# Patient Record
Sex: FEMALE | Race: WHITE | NOT HISPANIC OR LATINO
[De-identification: names, ages, dates, MRNs, and addresses within clinical notes are randomized per-mention and may not be internally consistent; named-entity substitution may affect disease eponyms.]

---

## 2019-04-01 ENCOUNTER — RESULT REVIEW (OUTPATIENT)
Age: 34
End: 2019-04-01

## 2019-04-08 PROBLEM — Z00.00 ENCOUNTER FOR PREVENTIVE HEALTH EXAMINATION: Status: ACTIVE | Noted: 2019-04-08

## 2019-05-06 ENCOUNTER — APPOINTMENT (OUTPATIENT)
Dept: ANTEPARTUM | Facility: CLINIC | Age: 34
End: 2019-05-06
Payer: COMMERCIAL

## 2019-05-06 ENCOUNTER — ASOB RESULT (OUTPATIENT)
Age: 34
End: 2019-05-06

## 2019-05-06 PROCEDURE — 36415 COLL VENOUS BLD VENIPUNCTURE: CPT

## 2019-05-06 PROCEDURE — 99241 OFFICE CONSULTATION NEW/ESTAB PATIENT 15 MIN: CPT | Mod: 25

## 2019-05-06 PROCEDURE — 76811 OB US DETAILED SNGL FETUS: CPT

## 2019-05-14 ENCOUNTER — OUTPATIENT (OUTPATIENT)
Dept: OUTPATIENT SERVICES | Age: 34
LOS: 1 days | Discharge: HOME | End: 2019-05-14

## 2019-05-15 ENCOUNTER — APPOINTMENT (OUTPATIENT)
Dept: PEDIATRIC CARDIOLOGY | Facility: CLINIC | Age: 34
End: 2019-05-15
Payer: COMMERCIAL

## 2019-05-15 PROCEDURE — 76821 MIDDLE CEREBRAL ARTERY ECHO: CPT

## 2019-05-15 PROCEDURE — 93325 DOPPLER ECHO COLOR FLOW MAPG: CPT | Mod: 59

## 2019-05-15 PROCEDURE — 76820 UMBILICAL ARTERY ECHO: CPT

## 2019-05-15 PROCEDURE — 76827 ECHO EXAM OF FETAL HEART: CPT

## 2019-05-15 PROCEDURE — 76825 ECHO EXAM OF FETAL HEART: CPT

## 2019-05-15 PROCEDURE — 99241 OFFICE CONSULTATION NEW/ESTAB PATIENT 15 MIN: CPT | Mod: 25

## 2019-09-05 ENCOUNTER — OUTPATIENT (OUTPATIENT)
Dept: OUTPATIENT SERVICES | Facility: HOSPITAL | Age: 34
LOS: 1 days | End: 2019-09-05

## 2019-09-05 ENCOUNTER — APPOINTMENT (OUTPATIENT)
Dept: ANTEPARTUM | Facility: CLINIC | Age: 34
End: 2019-09-05
Payer: MEDICAID

## 2019-09-05 ENCOUNTER — ASOB RESULT (OUTPATIENT)
Age: 34
End: 2019-09-05

## 2019-09-05 PROCEDURE — 76805 OB US >/= 14 WKS SNGL FETUS: CPT

## 2019-09-05 PROCEDURE — 76820 UMBILICAL ARTERY ECHO: CPT

## 2019-09-05 PROCEDURE — 99213 OFFICE O/P EST LOW 20 MIN: CPT | Mod: 25

## 2019-09-09 ENCOUNTER — ASOB RESULT (OUTPATIENT)
Age: 34
End: 2019-09-09

## 2019-09-09 ENCOUNTER — APPOINTMENT (OUTPATIENT)
Dept: ANTEPARTUM | Facility: CLINIC | Age: 34
End: 2019-09-09
Payer: MEDICAID

## 2019-09-09 ENCOUNTER — APPOINTMENT (OUTPATIENT)
Dept: ANTEPARTUM | Facility: HOSPITAL | Age: 34
End: 2019-09-09

## 2019-09-09 PROCEDURE — 76818 FETAL BIOPHYS PROFILE W/NST: CPT | Mod: 26

## 2019-09-12 ENCOUNTER — APPOINTMENT (OUTPATIENT)
Dept: ANTEPARTUM | Facility: CLINIC | Age: 34
End: 2019-09-12
Payer: MEDICAID

## 2019-09-12 ENCOUNTER — APPOINTMENT (OUTPATIENT)
Dept: ANTEPARTUM | Facility: HOSPITAL | Age: 34
End: 2019-09-12

## 2019-09-12 ENCOUNTER — OUTPATIENT (OUTPATIENT)
Dept: OUTPATIENT SERVICES | Facility: HOSPITAL | Age: 34
LOS: 1 days | End: 2019-09-12
Payer: MEDICAID

## 2019-09-12 ENCOUNTER — ASOB RESULT (OUTPATIENT)
Age: 34
End: 2019-09-12

## 2019-09-12 ENCOUNTER — OUTPATIENT (OUTPATIENT)
Dept: OUTPATIENT SERVICES | Facility: HOSPITAL | Age: 34
LOS: 1 days | End: 2019-09-12

## 2019-09-12 VITALS
HEART RATE: 78 BPM | SYSTOLIC BLOOD PRESSURE: 118 MMHG | TEMPERATURE: 98 F | WEIGHT: 158.07 LBS | RESPIRATION RATE: 14 BRPM | DIASTOLIC BLOOD PRESSURE: 72 MMHG | OXYGEN SATURATION: 99 % | HEIGHT: 63.75 IN

## 2019-09-12 LAB
ANTIBODY ID 1_1: SIGNIFICANT CHANGE UP
APPEARANCE UR: SIGNIFICANT CHANGE UP
BACTERIA # UR AUTO: SIGNIFICANT CHANGE UP
BILIRUB UR-MCNC: NEGATIVE — SIGNIFICANT CHANGE UP
BLD GP AB SCN SERPL QL: POSITIVE — SIGNIFICANT CHANGE UP
BLOOD UR QL VISUAL: NEGATIVE — SIGNIFICANT CHANGE UP
COLOR SPEC: YELLOW — SIGNIFICANT CHANGE UP
DAT POLY-SP REAG RBC QL: NEGATIVE — SIGNIFICANT CHANGE UP
GLUCOSE UR-MCNC: NEGATIVE — SIGNIFICANT CHANGE UP
HCT VFR BLD CALC: 31.6 % — LOW (ref 34.5–45)
HGB BLD-MCNC: 10.3 G/DL — LOW (ref 11.5–15.5)
HYALINE CASTS # UR AUTO: NEGATIVE — SIGNIFICANT CHANGE UP
KETONES UR-MCNC: NEGATIVE — SIGNIFICANT CHANGE UP
LEUKOCYTE ESTERASE UR-ACNC: SIGNIFICANT CHANGE UP
MCHC RBC-ENTMCNC: 29.7 PG — SIGNIFICANT CHANGE UP (ref 27–34)
MCHC RBC-ENTMCNC: 32.6 % — SIGNIFICANT CHANGE UP (ref 32–36)
MCV RBC AUTO: 91.1 FL — SIGNIFICANT CHANGE UP (ref 80–100)
NITRITE UR-MCNC: NEGATIVE — SIGNIFICANT CHANGE UP
NRBC # FLD: 0.04 K/UL — SIGNIFICANT CHANGE UP (ref 0–0)
PH UR: 7 — SIGNIFICANT CHANGE UP (ref 5–8)
PLATELET # BLD AUTO: 210 K/UL — SIGNIFICANT CHANGE UP (ref 150–400)
PMV BLD: 11.4 FL — SIGNIFICANT CHANGE UP (ref 7–13)
PROT UR-MCNC: 20 — SIGNIFICANT CHANGE UP
RBC # BLD: 3.47 M/UL — LOW (ref 3.8–5.2)
RBC # FLD: 12.4 % — SIGNIFICANT CHANGE UP (ref 10.3–14.5)
RBC CASTS # UR COMP ASSIST: SIGNIFICANT CHANGE UP (ref 0–?)
RH IG SCN BLD-IMP: NEGATIVE — SIGNIFICANT CHANGE UP
SP GR SPEC: 1.02 — SIGNIFICANT CHANGE UP (ref 1–1.04)
SQUAMOUS # UR AUTO: SIGNIFICANT CHANGE UP
UROBILINOGEN FLD QL: NORMAL — SIGNIFICANT CHANGE UP
WBC # BLD: 6.93 K/UL — SIGNIFICANT CHANGE UP (ref 3.8–10.5)
WBC # FLD AUTO: 6.93 K/UL — SIGNIFICANT CHANGE UP (ref 3.8–10.5)
WBC UR QL: >50 — HIGH (ref 0–?)

## 2019-09-12 PROCEDURE — 59025 FETAL NON-STRESS TEST: CPT | Mod: 26

## 2019-09-12 PROCEDURE — 86077 PHYS BLOOD BANK SERV XMATCH: CPT

## 2019-09-12 NOTE — OB PST NOTE - HISTORY OF PRESENT ILLNESS
34 year old pregnant female presents to PST with preop diagnosis of IUGR scheduled for cytotec induction of labor for IUGR YUAN 09/21/19. Reports good fetal movement, denies vaginal bleeding or leakage of amniotic fluid. Denies pelvic floor pain or contractions. Patient reports receiving Rhogam.

## 2019-09-12 NOTE — OB PST NOTE - NSHPREVIEWOFSYSTEMS_GEN_ALL_CORE
General: No fever, chills, sweating, anorexia, weight loss or weight gain. No polyphagia, polyurea, polydypsia, malaise, or fatigue    Skin: No rashes, itching, or dryness. No change in size/color of moles. No tumors, brittle nails, pitted nails, or hair loss    Breast: No tenderness, lumps, or nipple discharge      Ophthalmologic: No diplopia, photophobia, lacrimation, blurred Vision , or eye discharge    ENMT Symptoms: No hearing difficulty, ear pain, tinnitus, or vertigo. No sinus symptoms, nasal congestion, nasal   discharge, or nasal obstruction    Respiratory and Thorax: No wheezing, dyspnea, cough, hemoptysis, or pleuritic chest pPain     Cardiovascular: No chest pain, palpitations, dyspnea on exertion, orthopnea, paroxysmal nocturnal dyspnea,   peripheral edema, or claudication    Gastrointestinal: Occasional nausea, Denies vomiting, diarrhea, constipation, change in bowel habits, flatulence, abdominal pain, or melena    Genitourinary/ Pelvis: No hematuria, renal colic, or flank pain.  No urine discoloration, incontinence, dysuria, or urinary hesitancy. Normal urinary frequency. No nocturia, abnormal vaginal bleeding, vaginal discharge, spotting, pelvic pain, or vaginal leakage    Musculoskeletal: No arthralgia, arthritis, joint swelling, muscle cramping, muscle weakness, neck pain, arm pain, or leg pain    Neurological: No transient paralysis, weakness, paresthesias, or seizures. No syncope, tremors, vertigo, loss of sensation, difficulty walking, loss of consciousness, hemiparesis, confusion, or facial palsy    Psychiatric: PMH mood disorder- unable to describe. No suicidal ideation, anxiety, insomnia, memory loss, paranoia,  agitation, hallucinations, or hyperactivity    Hematology: No gum bleeding, nose bleeding, or skin lumps    Lymphatic: No enlarged or tender lymph nodes. No extremity swelling    Endocrine: No heat or cold intolerance    Immunologic: No recurrent or persistent infections General: No fever, chills, sweating, anorexia, weight loss or weight gain. No polyphagia, polyurea, polydypsia, malaise, or fatigue    Skin: No rashes, itching, or dryness. No change in size/color of moles. No tumors, brittle nails, pitted nails, or hair loss    Breast: No tenderness, lumps, or nipple discharge      Ophthalmologic: No diplopia, photophobia, lacrimation, blurred Vision , or eye discharge    ENMT Symptoms: Mild nasal congestion. No hearing difficulty, ear pain, tinnitus, or vertigo. No sinus symptoms, nasal   discharge, or nasal obstruction    Respiratory and Thorax: No wheezing, dyspnea, cough, hemoptysis, or pleuritic chest pPain     Cardiovascular: No chest pain, palpitations, dyspnea on exertion, orthopnea, paroxysmal nocturnal dyspnea,   peripheral edema, or claudication    Gastrointestinal: Occasional nausea, Denies vomiting, diarrhea, constipation, change in bowel habits, flatulence, abdominal pain, or melena    Genitourinary/ Pelvis: No hematuria, renal colic, or flank pain.  No urine discoloration, incontinence, dysuria, or urinary hesitancy. Normal urinary frequency. No nocturia, abnormal vaginal bleeding, vaginal discharge, spotting, pelvic pain, or vaginal leakage    Musculoskeletal: No arthralgia, arthritis, joint swelling, muscle cramping, muscle weakness, neck pain, arm pain, or leg pain    Neurological: No transient paralysis, weakness, paresthesias, or seizures. No syncope, tremors, vertigo, loss of sensation, difficulty walking, loss of consciousness, hemiparesis, confusion, or facial palsy    Psychiatric: PMH mood disorder- unable to describe. No suicidal ideation, anxiety, insomnia, memory loss, paranoia,  agitation, hallucinations, or hyperactivity    Hematology: No gum bleeding, nose bleeding, or skin lumps    Lymphatic: No enlarged or tender lymph nodes. No extremity swelling    Endocrine: No heat or cold intolerance    Immunologic: No recurrent or persistent infections

## 2019-09-12 NOTE — OB PST NOTE - VISION (WITH CORRECTIVE LENSES IF THE PATIENT USUALLY WEARS THEM):
uses glasses and contact/Partially impaired: cannot see medication labels or newsprint, but can see obstacles in path, and the surrounding layout; can count fingers at arm's length

## 2019-09-12 NOTE — OB PST NOTE - PROBLEM SELECTOR PLAN 1
Assessment and Plan: Patient scheduled for cytotec induction of labor for IUGR on 09/18/19  Patient provided with verbal and written presurgical instructions; verbalized understanding  with teach back.

## 2019-09-12 NOTE — OB PST NOTE - NSANTHOSAYNRD_GEN_A_CORE
No. GENIE screening performed.  STOP BANG Legend: 0-2 = LOW Risk; 3-4 = INTERMEDIATE Risk; 5-8 = HIGH Risk

## 2019-09-12 NOTE — OB PST NOTE - NSHPPHYSICALEXAM_GEN_ALL_CORE
Constitutional: Well Developed, Well Groomed, Well Nourished, No Distress    Eyes: PERRL, EOMI, conjunctiva clear    Ears: Normal    Mouth & Gums: Normal, moist    Pharynx: mild nasal congestion No tenderness, discharge, or peritonsillar abscess,     Tonsils: No Redness, discharge, tenderness, or swelling    Neck: Supple, no JVD, normal thyroid glands, no carotid bruits, no cervical vertebral or paraspinal tenderness    Breast: Patient declines    Back: Normal shape, ROM intact, strength intact, no vertebral tenderness    Respiratory: Airway patent, breath sounds equal, good air movement, respiration non-labored, clear to auscultation bilateral, no chest wall tenderness, no intercostal retractions, no rales, no wheezes, no rhonchi, no subcutaneous emphysema    Cardiovascular:  Regular rate and rhythm, no rubs or murmur, normal PMI    Gastrointestinal: Gravid abdomen Soft, non-tender, non distention, no masses palpable, bowel sound normal    Extremities: No clubbing, cyanosis, or pedal edema    Vascular:   Radial Pulse normal,  DP pulse normal, PT pulse normal    Neurological: alert & oriented x 3, sensation intact, cranial nerve intact, normal strength    Skin: warm and dry, normal color    Lymph Nodes: normal posterior cervical lymph node, normal anterior cervical lymph node, normal supraclavicular lymph node,     Musculoskeletal: ROM intact, no joint swelling, warmth, or calf tenderness. Normal strength    Psychiatric: normal affect, normal behavior

## 2019-09-13 LAB — T PALLIDUM AB TITR SER: NEGATIVE — SIGNIFICANT CHANGE UP

## 2019-09-16 ENCOUNTER — INPATIENT (INPATIENT)
Facility: HOSPITAL | Age: 34
LOS: 2 days | Discharge: HOME | End: 2019-09-19
Attending: SPECIALIST | Admitting: SPECIALIST
Payer: MEDICAID

## 2019-09-16 VITALS — DIASTOLIC BLOOD PRESSURE: 82 MMHG | HEART RATE: 68 BPM | SYSTOLIC BLOOD PRESSURE: 137 MMHG

## 2019-09-16 DIAGNOSIS — O26.899 OTHER SPECIFIED PREGNANCY RELATED CONDITIONS, UNSPECIFIED TRIMESTER: ICD-10-CM

## 2019-09-16 DIAGNOSIS — Z3A.00 WEEKS OF GESTATION OF PREGNANCY NOT SPECIFIED: ICD-10-CM

## 2019-09-16 LAB
ANTIBODY ID 1_1: SIGNIFICANT CHANGE UP
BASOPHILS # BLD AUTO: 0.02 K/UL — SIGNIFICANT CHANGE UP (ref 0–0.2)
BASOPHILS NFR BLD AUTO: 0.3 % — SIGNIFICANT CHANGE UP (ref 0–2)
DAT POLY-SP REAG RBC QL: NEGATIVE — SIGNIFICANT CHANGE UP
EOSINOPHIL # BLD AUTO: 0.06 K/UL — SIGNIFICANT CHANGE UP (ref 0–0.5)
EOSINOPHIL NFR BLD AUTO: 0.8 % — SIGNIFICANT CHANGE UP (ref 0–6)
HCT VFR BLD CALC: 33.9 % — LOW (ref 34.5–45)
HGB BLD-MCNC: 10.4 G/DL — LOW (ref 11.5–15.5)
IMM GRANULOCYTES NFR BLD AUTO: 0.4 % — SIGNIFICANT CHANGE UP (ref 0–1.5)
LYMPHOCYTES # BLD AUTO: 1.42 K/UL — SIGNIFICANT CHANGE UP (ref 1–3.3)
LYMPHOCYTES # BLD AUTO: 19.6 % — SIGNIFICANT CHANGE UP (ref 13–44)
MCHC RBC-ENTMCNC: 28.5 PG — SIGNIFICANT CHANGE UP (ref 27–34)
MCHC RBC-ENTMCNC: 30.7 % — LOW (ref 32–36)
MCV RBC AUTO: 92.9 FL — SIGNIFICANT CHANGE UP (ref 80–100)
MONOCYTES # BLD AUTO: 0.45 K/UL — SIGNIFICANT CHANGE UP (ref 0–0.9)
MONOCYTES NFR BLD AUTO: 6.2 % — SIGNIFICANT CHANGE UP (ref 2–14)
NEUTROPHILS # BLD AUTO: 5.28 K/UL — SIGNIFICANT CHANGE UP (ref 1.8–7.4)
NEUTROPHILS NFR BLD AUTO: 72.7 % — SIGNIFICANT CHANGE UP (ref 43–77)
NRBC # FLD: 0 K/UL — SIGNIFICANT CHANGE UP (ref 0–0)
PLATELET # BLD AUTO: 209 K/UL — SIGNIFICANT CHANGE UP (ref 150–400)
PMV BLD: 11.4 FL — SIGNIFICANT CHANGE UP (ref 7–13)
RBC # BLD: 3.65 M/UL — LOW (ref 3.8–5.2)
RBC # FLD: 12.4 % — SIGNIFICANT CHANGE UP (ref 10.3–14.5)
WBC # BLD: 7.26 K/UL — SIGNIFICANT CHANGE UP (ref 3.8–10.5)
WBC # FLD AUTO: 7.26 K/UL — SIGNIFICANT CHANGE UP (ref 3.8–10.5)

## 2019-09-16 PROCEDURE — 86077 PHYS BLOOD BANK SERV XMATCH: CPT

## 2019-09-16 RX ORDER — INFLUENZA VIRUS VACCINE 15; 15; 15; 15 UG/.5ML; UG/.5ML; UG/.5ML; UG/.5ML
0.5 SUSPENSION INTRAMUSCULAR ONCE
Refills: 0 | Status: DISCONTINUED | OUTPATIENT
Start: 2019-09-16 | End: 2019-09-19

## 2019-09-16 RX ORDER — SODIUM CHLORIDE 9 MG/ML
1000 INJECTION, SOLUTION INTRAVENOUS
Refills: 0 | Status: DISCONTINUED | OUTPATIENT
Start: 2019-09-16 | End: 2019-09-16

## 2019-09-16 RX ORDER — SODIUM CHLORIDE 9 MG/ML
1000 INJECTION, SOLUTION INTRAVENOUS
Refills: 0 | Status: DISCONTINUED | OUTPATIENT
Start: 2019-09-16 | End: 2019-09-17

## 2019-09-16 RX ORDER — OXYTOCIN 10 UNIT/ML
VIAL (ML) INJECTION
Qty: 20 | Refills: 0 | Status: DISCONTINUED | OUTPATIENT
Start: 2019-09-16 | End: 2019-09-16

## 2019-09-16 RX ORDER — OXYTOCIN 10 UNIT/ML
333.33 VIAL (ML) INJECTION
Qty: 20 | Refills: 0 | Status: DISCONTINUED | OUTPATIENT
Start: 2019-09-16 | End: 2019-09-18

## 2019-09-16 NOTE — OB PROVIDER TRIAGE NOTE - HISTORY OF PRESENT ILLNESS
34y.o. G1 at 39.2weeks sent in from Dr. Freeman's for unscheduled IOL for SGA baby.  Patient has been seen in ATU for poor fetal growth.  Recommended delivery at 39weeks.  Patient scheduled for induction of labor on Wednesday but due to ATU recommendation patient sent into office today.  Patient reports positive fetal movement, denies lof, denies vaginal bleeding, denies any contractions.    a/p course complications: SGA

## 2019-09-16 NOTE — OB PROVIDER TRIAGE NOTE - NSHPPHYSICALEXAM_GEN_ALL_CORE
Vital Signs Last 24 Hrs  T(C): 36.9 (16 Sep 2019 19:27), Max: 36.9 (16 Sep 2019 19:27)  T(F): 98.4 (16 Sep 2019 19:27), Max: 98.4 (16 Sep 2019 19:27)  HR: 68 (16 Sep 2019 19:27) (68 - 68)  BP: 137/82 (16 Sep 2019 19:27) (137/82 - 137/82)  BP(mean): --  RR: 18 (16 Sep 2019 19:27) (18 - 18)  SpO2: --    abdomen soft nontender gravid  cat 1 FHR   no uterine contractions     VE: 1/40/-3  Cephalic    Clinical EFW: 2700g

## 2019-09-16 NOTE — OB PROVIDER H&P - NSHPPHYSICALEXAM_GEN_ALL_CORE
Vital Signs Last 24 Hrs  T(C): 36.9 (16 Sep 2019 19:27), Max: 36.9 (16 Sep 2019 19:27)  T(F): 98.4 (16 Sep 2019 19:27), Max: 98.4 (16 Sep 2019 19:27)  HR: 68 (16 Sep 2019 19:27) (68 - 68)  BP: 137/82 (16 Sep 2019 19:27) (137/82 - 137/82)  BP(mean): --  RR: 18 (16 Sep 2019 19:27) (18 - 18)  SpO2: --    abdomen soft nontender gravid  cat 1 FHR   no uterine contractions     VE: 1/40/-3  Cephalic    Clinical EFW: 2700g Vital Signs Last 24 Hrs  T(C): 36.9 (16 Sep 2019 19:27), Max: 36.9 (16 Sep 2019 19:27)  T(F): 98.4 (16 Sep 2019 19:27), Max: 98.4 (16 Sep 2019 19:27)  HR: 68 (16 Sep 2019 19:27) (68 - 68)  BP: 137/82 (16 Sep 2019 19:27) (137/82 - 137/82)  BP(mean): --  RR: 18 (16 Sep 2019 19:27) (18 - 18)  SpO2: --    abdomen soft nontender gravid  cat 1 FHR   no uterine contractions     VE: 1/40/-3  Cephalic    Clinical EFW: 2600g

## 2019-09-16 NOTE — OB PROVIDER H&P - URINARY CATHETER
input(s): WBC, HGB, HCT, PLT in the last 72 hours. BMP: No results for input(s): NA, K, CO2, BUN, CREATININE, LABGLOM, GLUCOSE in the last 72 hours. PT/INR: No results for input(s): PROTIME, INR in the last 72 hours. FASTING LIPID PANEL:No results found for: HDL, LDLDIRECT, LDLCALC, TRIG  LIVER PROFILE:No results for input(s): AST, ALT, LABALBU in the last 72 hours.     IMPRESSION:    AFIb, PERMANENT   HTN: UNCONTROLLED  HLP  A/C WITH COUMADIN  CKD  Patient Active Problem List   Diagnosis    CKD (chronic kidney disease) stage 4, GFR 15-29 ml/min (HCC)    HTN (hypertension)    Solitary kidney    Bladder tumor    Hyperparathyroidism ([address]    Acute GI bleeding    Atrial fibrillation (HCC)       RECOMMENDATIONS:  INCREASE NORVASC TO 10 MG, METOPROLOL  MG BID  INR GOAL 2-3  RTC 3 MONTHS        Yadiel Reed MD  [facility]           [phone] no

## 2019-09-16 NOTE — OB PROVIDER IHI INDUCTION/AUGMENTATION NOTE - NS_CHECKALL_OBGYN_ALL_OB
Order was written/Contractions pattern was reviewed/Induction / Augmentation was discussed/FHR was reviewed/H&P was completed

## 2019-09-16 NOTE — OB RN TRIAGE NOTE - CHIEF COMPLAINT QUOTE
my doctor told me today to come in to be induced because the baby is small  scheduled for induction on 9/18

## 2019-09-16 NOTE — OB PROVIDER H&P - ASSESSMENT
pmh: anxiety; depression  psh: denies  obhx: denies  gynhx: denies    NKDA    Medications  Lexapro  PNV    pregnancy complicated by SGA: ATU report from 9/5/2019 reveals EFW 2285  recommended Delivery at 39w    Assessment  Vital Signs Last 24 Hrs  T(C): 36.9 (16 Sep 2019 19:27), Max: 36.9 (16 Sep 2019 19:27)  T(F): 98.4 (16 Sep 2019 19:27), Max: 98.4 (16 Sep 2019 19:27)  HR: 68 (16 Sep 2019 19:27) (68 - 68)  BP: 137/82 (16 Sep 2019 19:27) (137/82 - 137/82)  BP(mean): --  RR: 18 (16 Sep 2019 19:27) (18 - 18)  SpO2: --    abdomen soft nontender gravid  cat 1 FHR   no uterine contractions     VE: 1/40/-3  Cephalic    Clinical EFW: 2700g    Admit to Labor and Delivery for Induction of Labor for SGA  Routine Admission labs ordered  PO Cytotec for IOL   Epidural PRN    D/w Dr. Hines

## 2019-09-17 ENCOUNTER — APPOINTMENT (OUTPATIENT)
Dept: ANTEPARTUM | Facility: CLINIC | Age: 34
End: 2019-09-17

## 2019-09-17 ENCOUNTER — TRANSCRIPTION ENCOUNTER (OUTPATIENT)
Age: 34
End: 2019-09-17

## 2019-09-17 ENCOUNTER — RESULT REVIEW (OUTPATIENT)
Age: 34
End: 2019-09-17

## 2019-09-17 ENCOUNTER — APPOINTMENT (OUTPATIENT)
Dept: ANTEPARTUM | Facility: HOSPITAL | Age: 34
End: 2019-09-17

## 2019-09-17 DIAGNOSIS — O61.9 FAILED INDUCTION OF LABOR, UNSPECIFIED: ICD-10-CM

## 2019-09-17 LAB — T PALLIDUM AB TITR SER: NEGATIVE — SIGNIFICANT CHANGE UP

## 2019-09-17 PROCEDURE — 88307 TISSUE EXAM BY PATHOLOGIST: CPT | Mod: 26

## 2019-09-17 RX ORDER — GLYCERIN ADULT
1 SUPPOSITORY, RECTAL RECTAL AT BEDTIME
Refills: 0 | Status: DISCONTINUED | OUTPATIENT
Start: 2019-09-17 | End: 2019-09-19

## 2019-09-17 RX ORDER — IBUPROFEN 200 MG
600 TABLET ORAL EVERY 6 HOURS
Refills: 0 | Status: COMPLETED | OUTPATIENT
Start: 2019-09-17 | End: 2020-08-15

## 2019-09-17 RX ORDER — KETOROLAC TROMETHAMINE 30 MG/ML
30 SYRINGE (ML) INJECTION ONCE
Refills: 0 | Status: DISCONTINUED | OUTPATIENT
Start: 2019-09-17 | End: 2019-09-17

## 2019-09-17 RX ORDER — AER TRAVELER 0.5 G/1
1 SOLUTION RECTAL; TOPICAL EVERY 4 HOURS
Refills: 0 | Status: DISCONTINUED | OUTPATIENT
Start: 2019-09-17 | End: 2019-09-19

## 2019-09-17 RX ORDER — BENZOCAINE 10 %
1 GEL (GRAM) MUCOUS MEMBRANE EVERY 6 HOURS
Refills: 0 | Status: DISCONTINUED | OUTPATIENT
Start: 2019-09-17 | End: 2019-09-19

## 2019-09-17 RX ORDER — OXYCODONE HYDROCHLORIDE 5 MG/1
5 TABLET ORAL
Refills: 0 | Status: DISCONTINUED | OUTPATIENT
Start: 2019-09-17 | End: 2019-09-19

## 2019-09-17 RX ORDER — LANOLIN
1 OINTMENT (GRAM) TOPICAL EVERY 6 HOURS
Refills: 0 | Status: DISCONTINUED | OUTPATIENT
Start: 2019-09-17 | End: 2019-09-19

## 2019-09-17 RX ORDER — DIPHENHYDRAMINE HCL 50 MG
25 CAPSULE ORAL EVERY 6 HOURS
Refills: 0 | Status: DISCONTINUED | OUTPATIENT
Start: 2019-09-17 | End: 2019-09-19

## 2019-09-17 RX ORDER — HYDROCORTISONE 1 %
1 OINTMENT (GRAM) TOPICAL EVERY 6 HOURS
Refills: 0 | Status: DISCONTINUED | OUTPATIENT
Start: 2019-09-17 | End: 2019-09-19

## 2019-09-17 RX ORDER — SODIUM CHLORIDE 9 MG/ML
1000 INJECTION, SOLUTION INTRAVENOUS
Refills: 0 | Status: DISCONTINUED | OUTPATIENT
Start: 2019-09-17 | End: 2019-09-17

## 2019-09-17 RX ORDER — OXYTOCIN 10 UNIT/ML
333.33 VIAL (ML) INJECTION
Qty: 20 | Refills: 0 | Status: DISCONTINUED | OUTPATIENT
Start: 2019-09-17 | End: 2019-09-18

## 2019-09-17 RX ORDER — BUTORPHANOL TARTRATE 2 MG/ML
2 INJECTION, SOLUTION INTRAMUSCULAR; INTRAVENOUS ONCE
Refills: 0 | Status: DISCONTINUED | OUTPATIENT
Start: 2019-09-17 | End: 2019-09-17

## 2019-09-17 RX ORDER — ESCITALOPRAM OXALATE 10 MG/1
1 TABLET, FILM COATED ORAL
Qty: 0 | Refills: 0 | DISCHARGE

## 2019-09-17 RX ORDER — DOCUSATE SODIUM 100 MG
100 CAPSULE ORAL
Refills: 0 | Status: DISCONTINUED | OUTPATIENT
Start: 2019-09-17 | End: 2019-09-19

## 2019-09-17 RX ORDER — SODIUM CHLORIDE 9 MG/ML
3 INJECTION INTRAMUSCULAR; INTRAVENOUS; SUBCUTANEOUS EVERY 8 HOURS
Refills: 0 | Status: DISCONTINUED | OUTPATIENT
Start: 2019-09-17 | End: 2019-09-19

## 2019-09-17 RX ORDER — TETANUS TOXOID, REDUCED DIPHTHERIA TOXOID AND ACELLULAR PERTUSSIS VACCINE, ADSORBED 5; 2.5; 8; 8; 2.5 [IU]/.5ML; [IU]/.5ML; UG/.5ML; UG/.5ML; UG/.5ML
0.5 SUSPENSION INTRAMUSCULAR ONCE
Refills: 0 | Status: DISCONTINUED | OUTPATIENT
Start: 2019-09-17 | End: 2019-09-19

## 2019-09-17 RX ORDER — OXYCODONE HYDROCHLORIDE 5 MG/1
5 TABLET ORAL ONCE
Refills: 0 | Status: DISCONTINUED | OUTPATIENT
Start: 2019-09-17 | End: 2019-09-19

## 2019-09-17 RX ORDER — MAGNESIUM HYDROXIDE 400 MG/1
30 TABLET, CHEWABLE ORAL
Refills: 0 | Status: DISCONTINUED | OUTPATIENT
Start: 2019-09-17 | End: 2019-09-19

## 2019-09-17 RX ORDER — DIBUCAINE 1 %
1 OINTMENT (GRAM) RECTAL EVERY 6 HOURS
Refills: 0 | Status: DISCONTINUED | OUTPATIENT
Start: 2019-09-17 | End: 2019-09-19

## 2019-09-17 RX ORDER — ONDANSETRON 8 MG/1
4 TABLET, FILM COATED ORAL ONCE
Refills: 0 | Status: COMPLETED | OUTPATIENT
Start: 2019-09-17 | End: 2019-09-17

## 2019-09-17 RX ORDER — SIMETHICONE 80 MG/1
80 TABLET, CHEWABLE ORAL EVERY 4 HOURS
Refills: 0 | Status: DISCONTINUED | OUTPATIENT
Start: 2019-09-17 | End: 2019-09-19

## 2019-09-17 RX ORDER — ACETAMINOPHEN 500 MG
975 TABLET ORAL
Refills: 0 | Status: DISCONTINUED | OUTPATIENT
Start: 2019-09-17 | End: 2019-09-19

## 2019-09-17 RX ORDER — PRAMOXINE HYDROCHLORIDE 150 MG/15G
1 AEROSOL, FOAM RECTAL EVERY 4 HOURS
Refills: 0 | Status: DISCONTINUED | OUTPATIENT
Start: 2019-09-17 | End: 2019-09-19

## 2019-09-17 RX ORDER — OXYTOCIN 10 UNIT/ML
2 VIAL (ML) INJECTION
Qty: 30 | Refills: 0 | Status: DISCONTINUED | OUTPATIENT
Start: 2019-09-17 | End: 2019-09-18

## 2019-09-17 RX ADMIN — Medication 30 MILLIGRAM(S): at 22:00

## 2019-09-17 RX ADMIN — BUTORPHANOL TARTRATE 2 MILLIGRAM(S): 2 INJECTION, SOLUTION INTRAMUSCULAR; INTRAVENOUS at 11:23

## 2019-09-17 RX ADMIN — SODIUM CHLORIDE 125 MILLILITER(S): 9 INJECTION, SOLUTION INTRAVENOUS at 19:05

## 2019-09-17 RX ADMIN — SODIUM CHLORIDE 125 MILLILITER(S): 9 INJECTION, SOLUTION INTRAVENOUS at 08:39

## 2019-09-17 RX ADMIN — Medication 1000 MILLIUNIT(S)/MIN: at 21:02

## 2019-09-17 RX ADMIN — ONDANSETRON 4 MILLIGRAM(S): 8 TABLET, FILM COATED ORAL at 11:23

## 2019-09-17 RX ADMIN — Medication 2 MILLIUNIT(S)/MIN: at 19:44

## 2019-09-17 NOTE — DISCHARGE NOTE OB - PATIENT PORTAL LINK FT
You can access the FollowMyHealth Patient Portal offered by [facility] by registering at the following website: [email or web address]. By joining [facility]’s FollowMyHealth portal, you will also be able to view your health information using other applications (apps) compatible with our system.

## 2019-09-17 NOTE — OB NEONATOLOGY/PEDIATRICIAN DELIVERY SUMMARY - NSPEDSNEONOTESA_OBGYN_ALL_OB_FT
39.3 week female born to a 35 yo  mother via  induction. Peds called for category 2 tracing. Maternal blood type B- (rhogram given in July). Maternal hx nonsignificant. Prenatal hx nonsignificant. PNLs neg/NR/immune. GBS neg . AROM clear at 1840 . Baby emerged vigorous and crying. Warmed, dried and stimulated. Breastfeeding, consents HepB. APGARs 8/9. EOS 0.11.

## 2019-09-17 NOTE — DISCHARGE NOTE OB - CARE PROVIDER_API CALL
Little Delacruz)  Obstetrics and Gynecology  [address] [place] [address]  Phone: [phone]  Fax: [phone]  Follow Up Time:

## 2019-09-17 NOTE — OB RN DELIVERY SUMMARY - NS_SEPSISRSKCALC_OBGYN_ALL_OB_FT
EOS calculated successfully. EOS Risk Factor: 0.5/1000 live births ([facility][note 3] national incidence); GA=39w3d; Temp=98.78; ROM=2.267; GBS='Negative'; Antibiotics='No antibiotics or any antibiotics < 2 hrs prior to birth'

## 2019-09-17 NOTE — OB PROVIDER DELIVERY SUMMARY - NSPROVIDERDELIVERYNOTE_OBGYN_ALL_OB_FT
of liveborn infant, APGARs 8/9. Head, shoulders and torso were delivered easily. The infant was suctioned and handed to patient. Delayed cord clamping was performed and a segment was cut and taken for blood gases. Gentle cord traction and fundal massage resulted in spontaneous delivery of an intact placenta. Pitocin was started, uterine fundus was firm. Visualization of vaginal vault showed no vaginal laceration. Hemostasis was achieved.  Patient was stable, sponge count was correct. The patient was washed and to be sent to recovery.     Jesus Corral PGY1

## 2019-09-17 NOTE — DISCHARGE NOTE OB - MATERIALS PROVIDED
Immunization Record/Vaccinations/Discharge Medication Information for Patients and Families Pocket Guide/Letter of Medical Neccessity/Breastfeeding Mother’s Support Group Information/Guide to Postpartum Care/Prescription for Breast Pump/[facility] [place] Screening Program/[facility] Hearing Screen Program/Breastfeeding Log/Shaken Baby Prevention Handout/Back To Sleep Handout/Breastfeeding Guide and Packet/Birth Certificate Instructions/Tdap Vaccination (VIS Pub Date: 2012)

## 2019-09-17 NOTE — DISCHARGE NOTE OB - MEDICATION SUMMARY - MEDICATIONS TO TAKE
I will START or STAY ON the medications listed below when I get home from the hospital:  None I will START or STAY ON the medications listed below when I get home from the hospital:    ibuprofen 600 mg oral tablet  -- 1 tab(s) by mouth every 6 hours, As Needed  -- Indication: For pain    acetaminophen 325 mg oral tablet  -- 3 tab(s) by mouth , As Needed  -- Indication: For pain

## 2019-09-17 NOTE — DISCHARGE NOTE OB - CARE PLAN
Principal Discharge DX:	Small for gestational age  Goal:	routine  Assessment and plan of treatment:	routine

## 2019-09-18 RX ORDER — IBUPROFEN 200 MG
600 TABLET ORAL EVERY 6 HOURS
Refills: 0 | Status: DISCONTINUED | OUTPATIENT
Start: 2019-09-18 | End: 2019-09-19

## 2019-09-18 RX ADMIN — Medication 600 MILLIGRAM(S): at 12:04

## 2019-09-18 RX ADMIN — Medication 600 MILLIGRAM(S): at 06:00

## 2019-09-18 RX ADMIN — Medication 600 MILLIGRAM(S): at 05:07

## 2019-09-18 RX ADMIN — Medication 600 MILLIGRAM(S): at 01:27

## 2019-09-18 RX ADMIN — Medication 1 TABLET(S): at 12:04

## 2019-09-18 RX ADMIN — SODIUM CHLORIDE 3 MILLILITER(S): 9 INJECTION INTRAMUSCULAR; INTRAVENOUS; SUBCUTANEOUS at 05:07

## 2019-09-18 RX ADMIN — Medication 600 MILLIGRAM(S): at 19:20

## 2019-09-18 RX ADMIN — Medication 600 MILLIGRAM(S): at 18:42

## 2019-09-18 NOTE — PROGRESS NOTE ADULT - SUBJECTIVE AND OBJECTIVE BOX
ANESTHESIA POSTOP CHECK    34y Female POSTOP DAY 1 S/P vaginal delivery    Vital Signs Last 24 Hrs  T(C): 36.7 (18 Sep 2019 06:46), Max: 37.1 (17 Sep 2019 18:57)  T(F): 98.1 (18 Sep 2019 06:46), Max: 98.78 (17 Sep 2019 18:57)  HR: 70 (18 Sep 2019 06:46) (54 - 112)  BP: 99/62 (18 Sep 2019 06:46) (99/62 - 199/86)  BP(mean): --  RR: 17 (18 Sep 2019 06:46) (16 - 17)  SpO2: 98% (18 Sep 2019 06:46) (89% - 100%)  I&O's Summary    17 Sep 2019 07:01  -  18 Sep 2019 07:00  --------------------------------------------------------  IN: 0 mL / OUT: 2175 mL / NET: -2175 mL        [x ] NO APPARENT ANESTHESIA COMPLICATIONS      Comments:

## 2019-09-18 NOTE — PROGRESS NOTE ADULT - SUBJECTIVE AND OBJECTIVE BOX
S: Patient doing well.     Pain controlled.  +OOB.  +void.    Tolerating PO.  Lochia WNL.    O: Vital Signs Last 24 Hrs  T(C): 36.6 (18 Sep 2019 02:24), Max: 37.1 (17 Sep 2019 18:57)  T(F): 97.9 (18 Sep 2019 02:24), Max: 98.78 (17 Sep 2019 18:57)  HR: 58 (17 Sep 2019 23:15) (54 - 112)  BP: 127/67 (17 Sep 2019 23:15) (103/11 - 199/86)  BP(mean): --  RR: 16 (18 Sep 2019 02:24) (16 - 17)  SpO2: 99% (18 Sep 2019 02:24) (89% - 100%)      Pt without complaints  vital signs stable  Abdomen soft  fundus firm, non tender  extremities non tender  lochia wnl    Patient doing well  Routine post partum care    Labs:                        10.4   7.26  )-----------( 209      ( 16 Sep 2019 21:11 )             33.9       ABO Interpretation: B ( @ 16:36)    Rh Interpretation: Negative ( @ 16:36)    Antibody Screen Positive      A: 34y s/p  doing well.   -Continue routine postpartum care.  -Discharge planned for tomorrow

## 2019-09-18 NOTE — LACTATION INITIAL EVALUATION - NS LACT CON REASON FOR REQ
Discussed breastfeeding strategies, assistance offered prn./primaparous mom/general questions without assessment

## 2019-09-19 VITALS
OXYGEN SATURATION: 100 % | SYSTOLIC BLOOD PRESSURE: 118 MMHG | RESPIRATION RATE: 17 BRPM | TEMPERATURE: 98 F | DIASTOLIC BLOOD PRESSURE: 63 MMHG | HEART RATE: 70 BPM

## 2019-09-19 RX ORDER — IBUPROFEN 200 MG
1 TABLET ORAL
Qty: 0 | Refills: 0 | DISCHARGE
Start: 2019-09-19

## 2019-09-19 RX ORDER — ACETAMINOPHEN 500 MG
3 TABLET ORAL
Qty: 0 | Refills: 0 | DISCHARGE
Start: 2019-09-19

## 2019-09-19 RX ADMIN — Medication 600 MILLIGRAM(S): at 07:39

## 2019-09-19 RX ADMIN — Medication 600 MILLIGRAM(S): at 01:00

## 2019-09-19 RX ADMIN — Medication 600 MILLIGRAM(S): at 00:00

## 2019-09-19 RX ADMIN — Medication 600 MILLIGRAM(S): at 06:38

## 2019-09-19 NOTE — PROGRESS NOTE ADULT - SUBJECTIVE AND OBJECTIVE BOX
Subjective  Pain: well controlled  Complaints: none  Milestones: Alert and orientedx3. Out of bed ambulating. Voiding/Due to void. Tolerating a regular diet.  Infant feeding: breast                                 Feeding related issues or concerns:none    Objective   T(C): 36.7 (09-19-19 @ 05:26), Max: 36.7 (09-18-19 @ 18:12)  HR: 70 (09-19-19 @ 05:26) (70 - 73)  BP: 118/63 (09-19-19 @ 05:26) (106/74 - 118/63)  RR: 17 (09-19-19 @ 05:26) (15 - 17)  SpO2: 100% (09-19-19 @ 05:26) (100% - 100%)  Wt(kg): --      Assessment      35 y/o G 1 P 1 .Day #2  postpartum.  Assisted delivery (vacuum, forceps): n/a  Indication for assisted delivery:  Past medical history significant for Anxiety  Current Issues: none  Breasts: soft  Abdomen: Soft, nontender, nondistended.  Vagina: Lochia moderate  Perineum:  intact,   Lower extremities: No edema noted bilaterally of lower extremities. Nontender calves.  Negative Stacey's sign. Positive pedal pulses.  Other relevant physical exam findings;      Plan  Plan: Increase ambulation, analgesia PRN and pain medication protocal standing oxycodone, ibuprofen and acetaminophen.  Diet: Continue regular diet  Labs: nl  Continue routine postpartum care.   Anticipated discharge today Subjective  Pain: well controlled  Complaints: none  Milestones: Alert and orientedx3. Out of bed ambulating. Voiding/Due to void. Tolerating a regular diet.  Infant feeding: breast                                 Feeding related issues or concerns:none    Objective   T(C): 36.7 (09-19-19 @ 05:26), Max: 36.7 (09-18-19 @ 18:12)  HR: 70 (09-19-19 @ 05:26) (70 - 73)  BP: 118/63 (09-19-19 @ 05:26) (106/74 - 118/63)  RR: 17 (09-19-19 @ 05:26) (15 - 17)  SpO2: 100% (09-19-19 @ 05:26) (100% - 100%)  Wt(kg): --      Assessment      33 y/o G 1 P 1 .Day #2  postpartum.  Assisted delivery (vacuum, forceps): n/a  Indication for assisted delivery:  Past medical history significant for Anxiety  Current Issues: none  Breasts: soft  Abdomen: Soft, nontender, nondistended.  Vagina: Lochia moderate  Perineum:  intact,   Lower extremities: No edema noted bilaterally of lower extremities. Nontender calves.  Negative Stacey's sign. Positive pedal pulses.  Other relevant physical exam findings;      Plan  Plan: Increase ambulation, analgesia PRN and pain medication protocal standing oxycodone, ibuprofen and acetaminophen.  Diet: Continue regular diet  Labs: nl  Social work consult  Continue routine postpartum care.   Anticipated discharge today

## 2019-09-20 DIAGNOSIS — O28.1 ABNORMAL BIOCHEMICAL FINDING ON ANTENATAL SCREENING OF MOTHER: ICD-10-CM

## 2019-09-20 DIAGNOSIS — O36.5930 MATERNAL CARE FOR OTHER KNOWN OR SUSPECTED POOR FETAL GROWTH, THIRD TRIMESTER, NOT APPLICABLE OR UNSPECIFIED: ICD-10-CM

## 2019-09-20 DIAGNOSIS — Z82.79 FAMILY HISTORY OF OTHER CONGENITAL MALFORMATIONS, DEFORMATIONS AND CHROMOSOMAL ABNORMALITIES: ICD-10-CM

## 2019-09-20 DIAGNOSIS — Z3A.37 37 WEEKS GESTATION OF PREGNANCY: ICD-10-CM

## 2019-10-03 DIAGNOSIS — Z82.79 FAMILY HISTORY OF OTHER CONGENITAL MALFORMATIONS, DEFORMATIONS AND CHROMOSOMAL ABNORMALITIES: ICD-10-CM

## 2019-10-03 DIAGNOSIS — Z3A.38 38 WEEKS GESTATION OF PREGNANCY: ICD-10-CM

## 2019-10-03 DIAGNOSIS — O28.1 ABNORMAL BIOCHEMICAL FINDING ON ANTENATAL SCREENING OF MOTHER: ICD-10-CM

## 2019-10-03 DIAGNOSIS — O36.5930 MATERNAL CARE FOR OTHER KNOWN OR SUSPECTED POOR FETAL GROWTH, THIRD TRIMESTER, NOT APPLICABLE OR UNSPECIFIED: ICD-10-CM

## 2020-06-10 ENCOUNTER — RESULT REVIEW (OUTPATIENT)
Age: 35
End: 2020-06-10

## 2020-07-14 ENCOUNTER — ASOB RESULT (OUTPATIENT)
Age: 35
End: 2020-07-14

## 2020-07-14 ENCOUNTER — APPOINTMENT (OUTPATIENT)
Dept: ANTEPARTUM | Facility: CLINIC | Age: 35
End: 2020-07-14
Payer: MEDICAID

## 2020-07-14 ENCOUNTER — APPOINTMENT (OUTPATIENT)
Dept: MATERNAL FETAL MEDICINE | Facility: CLINIC | Age: 35
End: 2020-07-14

## 2020-07-14 PROCEDURE — 36416 COLLJ CAPILLARY BLOOD SPEC: CPT

## 2020-07-14 PROCEDURE — 99213 OFFICE O/P EST LOW 20 MIN: CPT | Mod: 25

## 2020-07-14 PROCEDURE — 76801 OB US < 14 WKS SINGLE FETUS: CPT

## 2020-07-14 PROCEDURE — 36415 COLL VENOUS BLD VENIPUNCTURE: CPT

## 2020-07-14 PROCEDURE — 76813 OB US NUCHAL MEAS 1 GEST: CPT

## 2020-09-01 ENCOUNTER — APPOINTMENT (OUTPATIENT)
Dept: ANTEPARTUM | Facility: CLINIC | Age: 35
End: 2020-09-01

## 2020-09-01 ENCOUNTER — ASOB RESULT (OUTPATIENT)
Age: 35
End: 2020-09-01

## 2020-09-01 ENCOUNTER — APPOINTMENT (OUTPATIENT)
Dept: ANTEPARTUM | Facility: CLINIC | Age: 35
End: 2020-09-01
Payer: MEDICAID

## 2020-09-01 PROCEDURE — 76811 OB US DETAILED SNGL FETUS: CPT

## 2020-11-10 ENCOUNTER — ASOB RESULT (OUTPATIENT)
Age: 35
End: 2020-11-10

## 2020-11-10 ENCOUNTER — APPOINTMENT (OUTPATIENT)
Dept: ANTEPARTUM | Facility: CLINIC | Age: 35
End: 2020-11-10
Payer: MEDICAID

## 2020-11-10 PROCEDURE — 76819 FETAL BIOPHYS PROFIL W/O NST: CPT

## 2020-11-10 PROCEDURE — 99072 ADDL SUPL MATRL&STAF TM PHE: CPT

## 2020-11-10 PROCEDURE — 76816 OB US FOLLOW-UP PER FETUS: CPT

## 2020-11-24 ENCOUNTER — APPOINTMENT (OUTPATIENT)
Dept: ANTEPARTUM | Facility: CLINIC | Age: 35
End: 2020-11-24
Payer: MEDICAID

## 2020-11-24 ENCOUNTER — ASOB RESULT (OUTPATIENT)
Age: 35
End: 2020-11-24

## 2020-11-24 ENCOUNTER — APPOINTMENT (OUTPATIENT)
Dept: ANTEPARTUM | Facility: HOSPITAL | Age: 35
End: 2020-11-24

## 2020-11-24 PROCEDURE — 76819 FETAL BIOPHYS PROFIL W/O NST: CPT

## 2020-11-24 PROCEDURE — 76816 OB US FOLLOW-UP PER FETUS: CPT

## 2020-12-22 ENCOUNTER — ASOB RESULT (OUTPATIENT)
Age: 35
End: 2020-12-22

## 2020-12-22 ENCOUNTER — APPOINTMENT (OUTPATIENT)
Dept: ANTEPARTUM | Facility: CLINIC | Age: 35
End: 2020-12-22
Payer: MEDICAID

## 2020-12-22 PROCEDURE — 99072 ADDL SUPL MATRL&STAF TM PHE: CPT

## 2020-12-22 PROCEDURE — 76816 OB US FOLLOW-UP PER FETUS: CPT

## 2020-12-22 PROCEDURE — 76819 FETAL BIOPHYS PROFIL W/O NST: CPT

## 2021-01-06 ENCOUNTER — RESULT REVIEW (OUTPATIENT)
Age: 36
End: 2021-01-06

## 2021-01-06 ENCOUNTER — INPATIENT (INPATIENT)
Facility: HOSPITAL | Age: 36
LOS: 1 days | Discharge: HOME | End: 2021-01-08
Attending: SPECIALIST | Admitting: SPECIALIST
Payer: MEDICAID

## 2021-01-06 VITALS — TEMPERATURE: 99 F

## 2021-01-06 DIAGNOSIS — Z3A.00 WEEKS OF GESTATION OF PREGNANCY NOT SPECIFIED: ICD-10-CM

## 2021-01-06 DIAGNOSIS — O26.899 OTHER SPECIFIED PREGNANCY RELATED CONDITIONS, UNSPECIFIED TRIMESTER: ICD-10-CM

## 2021-01-06 LAB
BASOPHILS # BLD AUTO: 0.03 K/UL — SIGNIFICANT CHANGE UP (ref 0–0.2)
BASOPHILS NFR BLD AUTO: 0.5 % — SIGNIFICANT CHANGE UP (ref 0–2)
EOSINOPHIL # BLD AUTO: 0.07 K/UL — SIGNIFICANT CHANGE UP (ref 0–0.5)
EOSINOPHIL NFR BLD AUTO: 1.1 % — SIGNIFICANT CHANGE UP (ref 0–6)
HCT VFR BLD CALC: 29.2 % — LOW (ref 34.5–45)
HGB BLD-MCNC: 8.8 G/DL — LOW (ref 11.5–15.5)
IANC: 4.13 K/UL — SIGNIFICANT CHANGE UP (ref 1.5–8.5)
IMM GRANULOCYTES NFR BLD AUTO: 0.3 % — SIGNIFICANT CHANGE UP (ref 0–1.5)
LYMPHOCYTES # BLD AUTO: 1.41 K/UL — SIGNIFICANT CHANGE UP (ref 1–3.3)
LYMPHOCYTES # BLD AUTO: 23 % — SIGNIFICANT CHANGE UP (ref 13–44)
MCHC RBC-ENTMCNC: 26 PG — LOW (ref 27–34)
MCHC RBC-ENTMCNC: 30.1 GM/DL — LOW (ref 32–36)
MCV RBC AUTO: 86.1 FL — SIGNIFICANT CHANGE UP (ref 80–100)
MONOCYTES # BLD AUTO: 0.48 K/UL — SIGNIFICANT CHANGE UP (ref 0–0.9)
MONOCYTES NFR BLD AUTO: 7.8 % — SIGNIFICANT CHANGE UP (ref 2–14)
NEUTROPHILS # BLD AUTO: 4.13 K/UL — SIGNIFICANT CHANGE UP (ref 1.8–7.4)
NEUTROPHILS NFR BLD AUTO: 67.3 % — SIGNIFICANT CHANGE UP (ref 43–77)
NRBC # BLD: 0 /100 WBCS — SIGNIFICANT CHANGE UP
NRBC # FLD: 0.02 K/UL — HIGH
PLATELET # BLD AUTO: 222 K/UL — SIGNIFICANT CHANGE UP (ref 150–400)
RBC # BLD: 3.39 M/UL — LOW (ref 3.8–5.2)
RBC # FLD: 13.7 % — SIGNIFICANT CHANGE UP (ref 10.3–14.5)
WBC # BLD: 6.14 K/UL — SIGNIFICANT CHANGE UP (ref 3.8–10.5)
WBC # FLD AUTO: 6.14 K/UL — SIGNIFICANT CHANGE UP (ref 3.8–10.5)

## 2021-01-06 PROCEDURE — 86077 PHYS BLOOD BANK SERV XMATCH: CPT

## 2021-01-06 PROCEDURE — 88307 TISSUE EXAM BY PATHOLOGIST: CPT | Mod: 26

## 2021-01-06 RX ORDER — OXYTOCIN 10 UNIT/ML
VIAL (ML) INJECTION
Qty: 20 | Refills: 0 | Status: DISCONTINUED | OUTPATIENT
Start: 2021-01-06 | End: 2021-01-06

## 2021-01-06 RX ORDER — SODIUM CHLORIDE 9 MG/ML
1000 INJECTION, SOLUTION INTRAVENOUS
Refills: 0 | Status: DISCONTINUED | OUTPATIENT
Start: 2021-01-06 | End: 2021-01-06

## 2021-01-06 RX ORDER — OXYTOCIN 10 UNIT/ML
333.33 VIAL (ML) INJECTION
Qty: 20 | Refills: 0 | Status: DISCONTINUED | OUTPATIENT
Start: 2021-01-06 | End: 2021-01-07

## 2021-01-06 RX ORDER — SODIUM CHLORIDE 9 MG/ML
1000 INJECTION, SOLUTION INTRAVENOUS
Refills: 0 | Status: DISCONTINUED | OUTPATIENT
Start: 2021-01-06 | End: 2021-01-07

## 2021-01-06 RX ADMIN — SODIUM CHLORIDE 125 MILLILITER(S): 9 INJECTION, SOLUTION INTRAVENOUS at 23:36

## 2021-01-06 NOTE — OB RN TRIAGE NOTE - PMH
(normal spontaneous vaginal delivery)  2019 FT F 5#3 IOL for IUGR  PMDD (premenstrual dysphoric disorder)

## 2021-01-06 NOTE — OB PROVIDER H&P - NSHPPHYSICALEXAM_GEN_ALL_CORE
BP: 139/72, HR: 76, Temp: 37  Abdomen soft nontender  SVE: 1.5/60/-3  TAS: Cephalic presentation  GBS: Neg. (12/30/2020)  EFW: 3400gms by leopolds  FHR: 125bpm, moderate variability, accelerations, no decelerations   Chalo irregularly

## 2021-01-06 NOTE — OB PROVIDER TRIAGE NOTE - HISTORY OF PRESENT ILLNESS
Pt. is a 36y/o  EGA 38.2wks reports to triage for scheduled IOL for cholestasis. Pt. states her bile acids were 14. Pt. denies abdominal contractions, leakage of fluid, vaginal bleeding or decrease fetal movement.     AP: Cholestasis  Medical Hx: Premenstrual dysphoric disorder  Surgical Hx: Denies  OBGYN Hx   2019 5#3 IOL for IUGR  Meds: Lexapro 20mg, PNV  NKDA

## 2021-01-06 NOTE — OB RN PATIENT PROFILE - NS MD HP INPLANTS MED DEV
Subjective:       Patient ID:  Juan Manuel Hdz is a 36 y.o. male who presents for   Chief Complaint   Patient presents with    Dry Skin     dry skin and redness to cheeks and nose     Dry Skin  - Initial  Affected locations: face  Duration: 5 months  Signs / symptoms: dryness, redness and scaling  Timing: constant  Aggravated by: nothing  Relieving factors/Treatments tried: OTC moisturizer  Improvement on treatment: no relief        Review of Systems   Eyes: Negative for eye irritation.   Skin: Positive for itching, rash and dry skin.   Allergic/Immunologic: Positive for environmental allergies.        Objective:    Physical Exam   Constitutional: He appears well-developed and well-nourished. No distress.   Neurological: He is alert and oriented to person, place, and time. He is not disoriented.   Psychiatric: He has a normal mood and affect.   Skin:   Areas Examined (abnormalities noted in diagram):   Scalp / Hair Palpated and Inspected  Head / Face Inspection Performed  Neck Inspection Performed  Chest / Axilla Inspection Performed  Back Inspection Performed              Diagram Legend     Erythematous scaling macule/papule c/w actinic keratosis       Vascular papule c/w angioma      Pigmented verrucoid papule/plaque c/w seborrheic keratosis      Yellow umbilicated papule c/w sebaceous hyperplasia      Irregularly shaped tan macule c/w lentigo     1-2 mm smooth white papules consistent with Milia      Movable subcutaneous cyst with punctum c/w epidermal inclusion cyst      Subcutaneous movable cyst c/w pilar cyst      Firm pink to brown papule c/w dermatofibroma      Pedunculated fleshy papule(s) c/w skin tag(s)      Evenly pigmented macule c/w junctional nevus     Mildly variegated pigmented, slightly irregular-bordered macule c/w mildly atypical nevus      Flesh colored to evenly pigmented papule c/w intradermal nevus       Pink pearly papule/plaque c/w basal cell carcinoma      Erythematous  hyperkeratotic cursted plaque c/w SCC      Surgical scar with no sign of skin cancer recurrence      Open and closed comedones      Inflammatory papules and pustules      Verrucoid papule consistent consistent with wart     Erythematous eczematous patches and plaques     Dystrophic onycholytic nail with subungual debris c/w onychomycosis     Umbilicated papule    Erythematous-base heme-crusted tan verrucoid plaque consistent with inflamed seborrheic keratosis     Erythematous Silvery Scaling Plaque c/w Psoriasis     See annotation      Assessment / Plan:        Seborrheic dermatitis  -     fluconazole (DIFLUCAN) 200 MG Tab; 1 po biw  Dispense: 8 tablet; Refill: 0  -     LOCOID 0.1 % Lotn; AAA bid  Dispense: 1 Bottle; Refill: 3    Recommend wash face daily with SebaMed. You may find this in local drugstores or search online.             Return in about 6 weeks (around 7/11/2017).   None

## 2021-01-06 NOTE — OB RN PATIENT PROFILE - NSSTATEDREASONFORADM_OBGYN_A_OB_FT
I will START or STAY ON the medications listed below when I get home from the hospital:    acetaminophen 325 mg oral tablet  -- 2 tab(s) by mouth every 6 hours, As needed, Mild Pain (1 - 3)  -- Indication: For Mild pain    morphine 15 mg/12 hr oral tablet, extended release  -- 1 tab(s) by mouth every 12 hours  -- Indication: For Pain    oxyCODONE 5 mg oral tablet  -- 1 tab(s) by mouth every 4 hours, As needed, Moderate Pain (4 - 6)  -- Indication: For severe pain    heparin  -- 5 unit(s) subcutaneous every 12 hours.    DVT PROPHYLAXIS  -- Indication: For Dv    mirtazapine 15 mg oral tablet  -- 1 tab(s) by mouth once a day (at bedtime)  -- Indication: For Mood    dronabinol 2.5 mg oral capsule  -- 1 cap(s) by mouth 2 times a day  -- Indication: For Antiemetic    metoclopramide 10 mg oral tablet  -- 1 tab(s) by mouth every 6 hours, As needed, nausea  -- Indication: For Antiemetic    Zofran 4 mg oral tablet  -- 1 tab(s) by mouth 2 times a day PRIOR TO   PAIN MEDS.   -- Indication: For Antiemetic    atorvastatin 40 mg oral tablet  -- 1 tab(s) by mouth once a day (at bedtime)  -- Indication: For Cholesterol    carvedilol 6.25 mg oral tablet  -- 1 tab(s) by mouth every 12 hours  -- Indication: For CAD    ipratropium-albuterol 0.5 mg-2.5 mg/3 mLinhalation solution  -- 3 milliliter(s) by nebulizer every 4 hours, As Needed - for shortness of breath and/or wheezing  -- Indication: For Bronchodilator    famotidine 20 mg oral tablet  -- 1 tab(s) by mouth once a day  -- Indication: For Stomach acid    bisacodyl 10 mg rectal suppository  -- 1 suppository(ies) rectally once a day, As needed, Constipation  -- Indication: For Laxative    polyethylene glycol 3350 oral powder for reconstitution  -- 17 gram(s) by mouth once a day  -- Indication: For Laxative    docusate sodium 100 mg oral capsule  -- 1 cap(s) by mouth 2 times a day  -- Indication: For Stool softener    senna oral tablet  -- 2 tab(s) by mouth once a day (at bedtime)  -- Indication: For Laxative    potassium chloride 20 mEq oral tablet, extended release  -- 1 tab(s) by mouth once a day  -- Indication: For Supplement    levoFLOXacin 500 mg oral tablet  -- 1 tab(s) by mouth every 24 hours.    -  STOP AFTER 5 DAYS.  -- Indication: For Antibiotic    folic acid 1 mg oral tablet  -- 1 tab(s) by mouth once a day  -- Indication: For Supplement I will START or STAY ON the medications listed below when I get home from the hospital:    acetaminophen 325 mg oral tablet  -- 2 tab(s) by mouth every 6 hours, As needed, Mild Pain (1 - 3)  -- Indication: For Mild pain    morphine 15 mg/12 hr oral tablet, extended release  -- 1 tab(s) by mouth every 12 hours  -- Indication: For Pain    oxyCODONE 5 mg oral tablet  -- 1 tab(s) by mouth every 4 hours, As needed, Moderate Pain (4 - 6)  -- Indication: For severe pain    heparin 5000 units/0.5 mL injectable solution  -- 5000 unit(s) injectable every 12 hours.  DVT PROPHYLAXIS  -- Indication: For Dvt prophylaxis    mirtazapine 15 mg oral tablet  -- 1 tab(s) by mouth once a day (at bedtime)  -- Indication: For Mood    dronabinol 2.5 mg oral capsule  -- 1 cap(s) by mouth 2 times a day  -- Indication: For Antiemetic    Zofran 4 mg oral tablet  -- 1 tab(s) by mouth 2 times a day PRIOR TO   PAIN MEDS.   -- Indication: For Antiemetic    metoclopramide 10 mg oral tablet  -- 1 tab(s) by mouth every 6 hours, As needed, nausea  -- Indication: For Antiemetic    atorvastatin 40 mg oral tablet  -- 1 tab(s) by mouth once a day (at bedtime)  -- Indication: For Cholesterol    carvedilol 6.25 mg oral tablet  -- 1 tab(s) by mouth every 12 hours  -- Indication: For CAD    ipratropium-albuterol 0.5 mg-2.5 mg/3 mLinhalation solution  -- 3 milliliter(s) by nebulizer every 4 hours, As Needed - for shortness of breath and/or wheezing  -- Indication: For Bronchodilator    famotidine 20 mg oral tablet  -- 1 tab(s) by mouth once a day  -- Indication: For Stomach acid    bisacodyl 10 mg rectal suppository  -- 1 suppository(ies) rectally once a day, As needed, Constipation  -- Indication: For Laxative    polyethylene glycol 3350 oral powder for reconstitution  -- 17 gram(s) by mouth once a day  -- Indication: For Laxative    docusate sodium 100 mg oral capsule  -- 1 cap(s) by mouth 2 times a day  -- Indication: For Stool softener    senna oral tablet  -- 2 tab(s) by mouth once a day (at bedtime)  -- Indication: For Laxative    potassium chloride 20 mEq oral tablet, extended release  -- 1 tab(s) by mouth once a day  -- Indication: For Supplement    levoFLOXacin 500 mg oral tablet  -- 1 tab(s) by mouth every 24 hours.    -  STOP AFTER 5 DAYS.  -- Indication: For Antibiotic    folic acid 1 mg oral tablet  -- 1 tab(s) by mouth once a day  -- Indication: For Supplement "I am being induced."

## 2021-01-06 NOTE — OB PROVIDER TRIAGE NOTE - NSHPPHYSICALEXAM_GEN_ALL_CORE
BP: 139/72, HR: 76, Temp: 37  Abdomen soft nontender  SVE: 1.5/60/-3  TAS: Cephalic presentation  GBS: Neg. ()  EFW: 3400gms by manjeetds  FHR: 125bpm, moderate variability, accelerations, no decelerations   [place] irregularly

## 2021-01-06 NOTE — OB PROVIDER H&P - HISTORY OF PRESENT ILLNESS
Pt. is a 36y/o  EGA 38.2wks reports to triage for scheduled IOL for cholestasis. Pt. states her bile acids were 14. Pt. denies abdominal contractions, leakage of fluid, vaginal bleeding or decrease fetal movement.     AP: Cholestasis  Medical Hx: Premenstrual dysphoric disorder  Surgical Hx: Denies  OBGYN Hx   2019 5#3 IOL for IUGR  Meds: Lexapro 20mg, PNV  NKDA Pt. is a 34y/o  EGA 38.2wks reports to triage for scheduled IOL for cholestasis. Pt. states her bile acids were 14. Pt. denies abdominal contractions, leakage of fluid, vaginal bleeding or decrease fetal movement.     AP: Elevated bile acids   Medical Hx: Premenstrual dysphoric disorder  Surgical Hx: Denies  OBGYN Hx   2019 5#3 IOL for IUGR  Meds: Lexapro 20mg, PNV  NKDA

## 2021-01-06 NOTE — OB PROVIDER TRIAGE NOTE - NS_BABIESUTERO_OBGYN_ALL_OB_NU
Quality 47: Advance Care Plan: Advance Care Planning discussed and documented in the medical record; patient did not wish or was not able to name a surrogate decision maker or provide an advance care plan.
Quality 226: Preventive Care And Screening: Tobacco Use: Screening And Cessation Intervention: Patient screened for tobacco use and is an ex/non-smoker
1
Detail Level: Detailed
Quality 431: Preventive Care And Screening: Unhealthy Alcohol Use - Screening: Patient screened for unhealthy alcohol use using a single question and scores less than 2 times per year

## 2021-01-07 ENCOUNTER — TRANSCRIPTION ENCOUNTER (OUTPATIENT)
Age: 36
End: 2021-01-07

## 2021-01-07 LAB
ALBUMIN SERPL ELPH-MCNC: 3.3 G/DL — SIGNIFICANT CHANGE UP (ref 3.3–5)
ALP SERPL-CCNC: 125 U/L — HIGH (ref 40–120)
ALT FLD-CCNC: 7 U/L — SIGNIFICANT CHANGE UP (ref 4–33)
ANION GAP SERPL CALC-SCNC: 14 MMOL/L — SIGNIFICANT CHANGE UP (ref 7–14)
APTT BLD: 28 SEC — SIGNIFICANT CHANGE UP (ref 27–36.3)
AST SERPL-CCNC: 21 U/L — SIGNIFICANT CHANGE UP (ref 4–32)
BILIRUB SERPL-MCNC: 0.3 MG/DL — SIGNIFICANT CHANGE UP (ref 0.2–1.2)
BLD GP AB SCN SERPL QL: POSITIVE — SIGNIFICANT CHANGE UP
BUN SERPL-MCNC: 11 MG/DL — SIGNIFICANT CHANGE UP (ref 7–23)
CALCIUM SERPL-MCNC: 8.7 MG/DL — SIGNIFICANT CHANGE UP (ref 8.4–10.5)
CHLORIDE SERPL-SCNC: 102 MMOL/L — SIGNIFICANT CHANGE UP (ref 98–107)
CO2 SERPL-SCNC: 21 MMOL/L — LOW (ref 22–31)
CREAT SERPL-MCNC: 0.79 MG/DL — SIGNIFICANT CHANGE UP (ref 0.5–1.3)
FIBRINOGEN PPP-MCNC: 662 MG/DL — HIGH (ref 290–520)
GLUCOSE SERPL-MCNC: 86 MG/DL — SIGNIFICANT CHANGE UP (ref 70–99)
HBV SURFACE AG SERPL QL IA: SIGNIFICANT CHANGE UP
HIV 1+2 AB+HIV1 P24 AG SERPL QL IA: SIGNIFICANT CHANGE UP
INR BLD: 0.93 RATIO — SIGNIFICANT CHANGE UP (ref 0.88–1.16)
POTASSIUM SERPL-MCNC: 4 MMOL/L — SIGNIFICANT CHANGE UP (ref 3.5–5.3)
POTASSIUM SERPL-SCNC: 4 MMOL/L — SIGNIFICANT CHANGE UP (ref 3.5–5.3)
PROT SERPL-MCNC: 6.3 G/DL — SIGNIFICANT CHANGE UP (ref 6–8.3)
PROTHROM AB SERPL-ACNC: 10.7 SEC — SIGNIFICANT CHANGE UP (ref 10.6–13.6)
RH IG SCN BLD-IMP: NEGATIVE — SIGNIFICANT CHANGE UP
RUBV IGG SER-ACNC: 2.3 INDEX — SIGNIFICANT CHANGE UP
RUBV IGG SER-IMP: POSITIVE — SIGNIFICANT CHANGE UP
SARS-COV-2 IGG SERPL QL IA: NEGATIVE — SIGNIFICANT CHANGE UP
SARS-COV-2 IGM SERPL IA-ACNC: 0.06 INDEX — SIGNIFICANT CHANGE UP
SARS-COV-2 RNA SPEC QL NAA+PROBE: SIGNIFICANT CHANGE UP
SODIUM SERPL-SCNC: 137 MMOL/L — SIGNIFICANT CHANGE UP (ref 135–145)
T PALLIDUM AB TITR SER: NEGATIVE — SIGNIFICANT CHANGE UP

## 2021-01-07 RX ORDER — IBUPROFEN 200 MG
600 TABLET ORAL EVERY 6 HOURS
Refills: 0 | Status: DISCONTINUED | OUTPATIENT
Start: 2021-01-07 | End: 2021-01-08

## 2021-01-07 RX ORDER — ESCITALOPRAM OXALATE 10 MG/1
20 TABLET, FILM COATED ORAL DAILY
Refills: 0 | Status: DISCONTINUED | OUTPATIENT
Start: 2021-01-07 | End: 2021-01-08

## 2021-01-07 RX ORDER — MAGNESIUM HYDROXIDE 400 MG/1
30 TABLET, CHEWABLE ORAL
Refills: 0 | Status: DISCONTINUED | OUTPATIENT
Start: 2021-01-07 | End: 2021-01-08

## 2021-01-07 RX ORDER — ACETAMINOPHEN 500 MG
975 TABLET ORAL
Refills: 0 | Status: DISCONTINUED | OUTPATIENT
Start: 2021-01-07 | End: 2021-01-08

## 2021-01-07 RX ORDER — SIMETHICONE 80 MG/1
80 TABLET, CHEWABLE ORAL EVERY 4 HOURS
Refills: 0 | Status: DISCONTINUED | OUTPATIENT
Start: 2021-01-07 | End: 2021-01-08

## 2021-01-07 RX ORDER — HYDROCORTISONE 1 %
1 OINTMENT (GRAM) TOPICAL EVERY 6 HOURS
Refills: 0 | Status: DISCONTINUED | OUTPATIENT
Start: 2021-01-07 | End: 2021-01-08

## 2021-01-07 RX ORDER — BENZOCAINE 10 %
1 GEL (GRAM) MUCOUS MEMBRANE EVERY 6 HOURS
Refills: 0 | Status: DISCONTINUED | OUTPATIENT
Start: 2021-01-07 | End: 2021-01-08

## 2021-01-07 RX ORDER — IBUPROFEN 200 MG
600 TABLET ORAL EVERY 6 HOURS
Refills: 0 | Status: COMPLETED | OUTPATIENT
Start: 2021-01-07 | End: 2021-12-06

## 2021-01-07 RX ORDER — AER TRAVELER 0.5 G/1
1 SOLUTION RECTAL; TOPICAL EVERY 4 HOURS
Refills: 0 | Status: DISCONTINUED | OUTPATIENT
Start: 2021-01-07 | End: 2021-01-08

## 2021-01-07 RX ORDER — KETOROLAC TROMETHAMINE 30 MG/ML
30 SYRINGE (ML) INJECTION ONCE
Refills: 0 | Status: DISCONTINUED | OUTPATIENT
Start: 2021-01-07 | End: 2021-01-07

## 2021-01-07 RX ORDER — TETANUS TOXOID, REDUCED DIPHTHERIA TOXOID AND ACELLULAR PERTUSSIS VACCINE, ADSORBED 5; 2.5; 8; 8; 2.5 [IU]/.5ML; [IU]/.5ML; UG/.5ML; UG/.5ML; UG/.5ML
0.5 SUSPENSION INTRAMUSCULAR ONCE
Refills: 0 | Status: DISCONTINUED | OUTPATIENT
Start: 2021-01-07 | End: 2021-01-08

## 2021-01-07 RX ORDER — IBUPROFEN 200 MG
1 TABLET ORAL
Qty: 0 | Refills: 0 | DISCHARGE
Start: 2021-01-07

## 2021-01-07 RX ORDER — OXYCODONE HYDROCHLORIDE 5 MG/1
5 TABLET ORAL ONCE
Refills: 0 | Status: DISCONTINUED | OUTPATIENT
Start: 2021-01-07 | End: 2021-01-08

## 2021-01-07 RX ORDER — OXYTOCIN 10 UNIT/ML
333.33 VIAL (ML) INJECTION
Qty: 20 | Refills: 0 | Status: DISCONTINUED | OUTPATIENT
Start: 2021-01-07 | End: 2021-01-08

## 2021-01-07 RX ORDER — LANOLIN
1 OINTMENT (GRAM) TOPICAL EVERY 6 HOURS
Refills: 0 | Status: DISCONTINUED | OUTPATIENT
Start: 2021-01-07 | End: 2021-01-08

## 2021-01-07 RX ORDER — DIBUCAINE 1 %
1 OINTMENT (GRAM) RECTAL EVERY 6 HOURS
Refills: 0 | Status: DISCONTINUED | OUTPATIENT
Start: 2021-01-07 | End: 2021-01-08

## 2021-01-07 RX ORDER — PRAMOXINE HYDROCHLORIDE 150 MG/15G
1 AEROSOL, FOAM RECTAL EVERY 4 HOURS
Refills: 0 | Status: DISCONTINUED | OUTPATIENT
Start: 2021-01-07 | End: 2021-01-08

## 2021-01-07 RX ORDER — OXYCODONE HYDROCHLORIDE 5 MG/1
5 TABLET ORAL
Refills: 0 | Status: DISCONTINUED | OUTPATIENT
Start: 2021-01-07 | End: 2021-01-08

## 2021-01-07 RX ORDER — SODIUM CHLORIDE 9 MG/ML
3 INJECTION INTRAMUSCULAR; INTRAVENOUS; SUBCUTANEOUS EVERY 8 HOURS
Refills: 0 | Status: DISCONTINUED | OUTPATIENT
Start: 2021-01-07 | End: 2021-01-08

## 2021-01-07 RX ORDER — OXYTOCIN 10 UNIT/ML
2 VIAL (ML) INJECTION
Qty: 30 | Refills: 0 | Status: DISCONTINUED | OUTPATIENT
Start: 2021-01-07 | End: 2021-01-07

## 2021-01-07 RX ORDER — DIPHENHYDRAMINE HCL 50 MG
25 CAPSULE ORAL EVERY 6 HOURS
Refills: 0 | Status: DISCONTINUED | OUTPATIENT
Start: 2021-01-07 | End: 2021-01-08

## 2021-01-07 RX ADMIN — Medication 30 MILLIGRAM(S): at 14:01

## 2021-01-07 RX ADMIN — Medication 2 MILLIUNIT(S)/MIN: at 09:49

## 2021-01-07 RX ADMIN — Medication 600 MILLIGRAM(S): at 23:16

## 2021-01-07 RX ADMIN — Medication 1000 MILLIUNIT(S)/MIN: at 14:01

## 2021-01-07 NOTE — CHART NOTE - NSCHARTNOTEFT_GEN_A_CORE
NP note    Pt seen for AROM. CB displaced in bathroom    T(C): 37.0 (07 Jan 2021 11:16), Max: 37.0 (06 Jan 2021 21:07)  T(F): 98.6 (07 Jan 2021 11:16), Max: 98.6 (06 Jan 2021 21:07)  HR: 74 (07 Jan 2021 11:21) (64 - 106)  BP: 113/72 (07 Jan 2021 11:14) (100/71 - 139/72)  RR: 16 (06 Jan 2021 23:51) (16 - 16)  SpO2: 100% (07 Jan 2021 11:21) (100% - 100%)  EFM Cat I  [place] irregular  SVE 4-5/60/-3    AROM small amount clear fluid    -Continue pitocin  -Transfer to LDR once room available  -D/W Dr. Lydia malin, NP

## 2021-01-07 NOTE — DISCHARGE NOTE OB - PATIENT PORTAL LINK FT
You can access the FollowMyHealth Patient Portal offered by [facility] by registering at the following website: [email or web address]. By joining [employer]’s FollowMyHealth portal, you will also be able to view your health information using other applications (apps) compatible with our system.

## 2021-01-07 NOTE — OB PROVIDER DELIVERY SUMMARY - NSPROVIDERDELIVERYNOTE_OBGYN_ALL_OB_FT
Spontaneous vaginal delivery of liveborn infant from OA position. Head, shoulders, and body delivered easily. Infant was suctioned. No mec. 1 minute delayed cord clamping was performed. Cord clamped and cut and infant passed to mother. Placenta delivered intact with a 3 vessel cord. Fundal massage was given and uterine fundus was found to be firm. Vaginal exam revealed an intact cervix, vaginal walls, and sulci. Excellent hemostasis was noted. Patient was stable. Count was correct x2.     Elsa De Santiago  PGY-1

## 2021-01-07 NOTE — DISCHARGE NOTE OB - MEDICATION SUMMARY - MEDICATIONS TO TAKE
I will START or STAY ON the medications listed below when I get home from the hospital:    ibuprofen 600 mg oral tablet  -- 1 tab(s) by mouth every 6 hours  -- Indication: For Other pregnancy-related conditions, antepartum    Lexapro 20 mg oral tablet  -- 1 tab(s) by mouth once a day  -- Indication: For Labor and delivery, indication for care    Prenatal Multivitamins with Folic Acid 1 mg oral tablet  -- 1 tab(s) by mouth once a day  -- Indication: For Labor and delivery, indication for care

## 2021-01-07 NOTE — DISCHARGE NOTE OB - CARE PROVIDER_API CALL
Naeem Freeman  OBSTETRICS AND GYNECOLOGY  [address] [place] [address]  Phone: [phone]  Fax: [phone]  Follow Up Time: 1 month

## 2021-01-07 NOTE — OB RN DELIVERY SUMMARY - NS_SEPSISRSKCALC_OBGYN_ALL_OB_FT
EOS calculated successfully. EOS Risk Factor: 0.5/1000 live births ([facility] national incidence); GA=38w3d; Temp=99.14; ROM=2.067; GBS='Negative'; Antibiotics='No antibiotics or any antibiotics < 2 hrs prior to birth'

## 2021-01-07 NOTE — DISCHARGE NOTE OB - MATERIALS PROVIDED
Vaccinations/[facility]  Screening Program/  Immunization Record/Breastfeeding Log/Breastfeeding Mother’s Support Group Information/Guide to Postpartum Care/[facility] Hearing Screen Program/Back To Sleep Handout/Shaken Baby Prevention Handout/Breastfeeding Guide and Packet/Discharge Medication Information for Patients and Families Pocket Guide

## 2021-01-07 NOTE — CHART NOTE - NSCHARTNOTEFT_GEN_A_CORE
NP note    Pt seen for placement of cervical balloon    T(C): 36.4 (07 Jan 2021 06:30), Max: 37.0 (06 Jan 2021 21:07)  T(F): 97.52 (07 Jan 2021 06:30), Max: 98.6 (06 Jan 2021 21:07)  HR: 75 (07 Jan 2021 06:30) (68 - 97)  BP: 111/60 (07 Jan 2021 06:30) (111/60 - 139/72)  RR: 16 (06 Jan 2021 23:51) (16 - 16)  SpO2: 100% (06 Jan 2021 23:51) (100% - 100%)  /moderate variability/+ accels/no decels   [place] irregualr  SVE 2/50/-3    Cervical balloon placed without incident. Pt tolerated well. Instilled with 60ccs in uterine/vaginal balloons.     -Maintain cervical balloon  -For pitocin once IV pump becomes available    kimo, NP

## 2021-01-07 NOTE — DISCHARGE NOTE OB - CARE PLAN
Principal Discharge DX:	 (normal spontaneous vaginal delivery)  Goal:	RECOVERY  Assessment and plan of treatment:	REGULAR DIET  AMBULATION ENCOURAGED

## 2021-01-08 VITALS
TEMPERATURE: 98 F | OXYGEN SATURATION: 99 % | HEART RATE: 79 BPM | DIASTOLIC BLOOD PRESSURE: 65 MMHG | SYSTOLIC BLOOD PRESSURE: 130 MMHG | RESPIRATION RATE: 18 BRPM

## 2021-01-08 LAB — KLEIHAUER-BETKE CALCULATION: 0 % — SIGNIFICANT CHANGE UP

## 2021-01-08 RX ADMIN — Medication 975 MILLIGRAM(S): at 10:46

## 2021-01-08 RX ADMIN — Medication 600 MILLIGRAM(S): at 12:08

## 2021-01-08 RX ADMIN — Medication 600 MILLIGRAM(S): at 06:24

## 2021-01-08 RX ADMIN — Medication 1 TABLET(S): at 12:08

## 2021-01-08 RX ADMIN — ESCITALOPRAM OXALATE 20 MILLIGRAM(S): 10 TABLET, FILM COATED ORAL at 12:08

## 2021-01-08 NOTE — PROGRESS NOTE ADULT - SUBJECTIVE AND OBJECTIVE BOX
S: Patient doing well. Minimal lochia. Pain controlled.    O: Vital Signs Last 24 Hrs  T(C): 36.4 (2021 06:09), Max: 37.3 (2021 12:30)  T(F): 97.6 (2021 06:09), Max: 99.14 (2021 12:30)  HR: 78 (2021 06:09) (64 - 179)  BP: 111/62 (2021 06:09) (100/71 - 146/58)  BP(mean): --  RR: 18 (2021 06:09) (18 - 18)  SpO2: 98% (2021 06:09) (90% - 100%)    Gen: NAD  Abd: soft, NT, ND, fundus firm below umbilicus  Lochia: moderate  Ext: no tenderness    Labs:                        8.8    6.14  )-----------( 222      ( 2021 23:47 )             29.2       A: 35y PPD# s/p  doing well.  Plan: Routine care Dc with instructions.

## 2021-02-03 LAB — SURGICAL PATHOLOGY STUDY: SIGNIFICANT CHANGE UP

## 2021-06-02 ENCOUNTER — RESULT REVIEW (OUTPATIENT)
Age: 36
End: 2021-06-02

## 2021-08-25 NOTE — OB PROVIDER H&P - NS_PROVCHECK_OBGYN_ALL_OB
Spoke to [facility] in Admissions with [facility]. Dale Sims is accepted for admission by Dr Son Peres. He will be admitted tot he [facility] unit. Provided [facility] with the phone number to call for nurse report. Patient was informed of the reason for this intervention.

## 2021-09-14 NOTE — OB RN PATIENT PROFILE - AS SC BRADEN FRICTION
Labs faxed to [facility] (3) no apparent problem

## 2022-02-24 PROBLEM — F32.81 PREMENSTRUAL DYSPHORIC DISORDER: Chronic | Status: ACTIVE | Noted: 2021-01-06

## 2022-03-01 ENCOUNTER — APPOINTMENT (OUTPATIENT)
Dept: ANTEPARTUM | Facility: CLINIC | Age: 37
End: 2022-03-01
Payer: MEDICAID

## 2022-03-01 ENCOUNTER — ASOB RESULT (OUTPATIENT)
Age: 37
End: 2022-03-01

## 2022-03-01 PROCEDURE — 76811 OB US DETAILED SNGL FETUS: CPT

## 2022-04-25 NOTE — CHART NOTE - NSCHARTNOTEFT_GEN_A_CORE
NP note    Pt seen for increased pain    Vital Signs Last 24 Hrs  T(C): 36.4 (17 Sep 2019 10:08), Max: 36.9 (16 Sep 2019 19:27)  T(F): 97.5 (17 Sep 2019 10:08), Max: 98.42 (17 Sep 2019 03:16)  HR: 64 (17 Sep 2019 11:03) (56 - 82)  BP: 121/72 (17 Sep 2019 10:08) (116/80 - 137/82)  BP(mean): --  RR: 16 (17 Sep 2019 10:08) (16 - 18)  SpO2: 100% (17 Sep 2019 11:03) (98% - 100%)    /mod louise/+ accels/no decels  [place] q2-4min  CB in place    -Stadol approved by Dr. Maribell malin, NP
NP note    Pt seen for increased pain    Vital Signs Last 24 Hrs  T(C): 36.6 (17 Sep 2019 13:55), Max: 36.9 (16 Sep 2019 19:27)  T(F): 97.8 (17 Sep 2019 13:55), Max: 98.42 (17 Sep 2019 03:16)  HR: 62 (17 Sep 2019 14:08) (56 - 82)  BP: 122/75 (17 Sep 2019 13:55) (116/80 - 137/82)  BP(mean): --  RR: 16 (17 Sep 2019 13:55) (16 - 18)  SpO2: 100% (17 Sep 2019 14:13) (93% - 100%)    /mod louise/+ accels/no decels  [place] q5min  SVE 4-5/50/-3    -Transfer to LDR for epidural  -Cont PO Cytotec    NIKHIL malin
NP note    Pt seen for placement of cervical balloon    Vital Signs Last 24 Hrs  T(C): 36.6 (17 Sep 2019 05:24), Max: 36.9 (16 Sep 2019 19:27)  T(F): 97.88 (17 Sep 2019 05:24), Max: 98.42 (17 Sep 2019 03:16)  HR: 70 (17 Sep 2019 09:26) (56 - 77)  BP: 119/61 (17 Sep 2019 03:15) (116/80 - 137/82)  BP(mean): --  RR: 17 (16 Sep 2019 19:58) (17 - 18)  SpO2: 100% (17 Sep 2019 09:26) (100% - 100%)    /mod louise/+ Accels/no decels  [place] irr  SVE 1/40/-3 unchanged    CB placed without incident. Instilled w/ 60ccs in both cervical/vaginal balloons. Pt tolerated well.    -Notify staff if pain management required at this time  -Maintain CB  -Cont PO cytotec    kimo, NP
PGY1 Note    Patient evaluated at bedside for increased pressure    VS  T(C): 37.1 (09-17-19 @ 18:57)  HR: 82 (09-17-19 @ 20:37)  BP: 134/68 (09-17-19 @ 20:37)  RR: 16 (09-17-19 @ 13:55)  SpO2: 100% (09-17-19 @ 20:37)    SVE: 10/100/0  FHR: indeterminate baseline / mod louise/ -acel / variable decel: cat 2 tracing   Stem: irregular ctx pattern    Patient fully dilated, has the urge to push  Dr. Archer aware    Jesus Corral PGY1
Pt with irregualr ctxs  115, accels, mod variability, no decels  CTX irregualr  VE 1/l  A/P for CB DW pt.
R1 Tracing Note     VS  T(C): 36.7 (19 @ 00:31)  HR: 61 (19 @ 00:10)  BP: 120/57 (19 @ 00:10)  RR: 17 (19 @ 19:58)  SpO2: 100% (19 @ 19:58)    EFM: 110bpm, mod louise, +accels, -decels  [place]: cx irreg    A/P: 35 yo  at 39.2w IOL for SGA  -IOL: c/w PO cytotoec  -Cat 1 tracing  -GBS neg  -EFW 2600g  -Anticipate     R Frankel PGY1
r4 ob note    pt examined at bedside to assess cervical change    SVE: 4.5/70/-3  EFM: 130/modvar/+acc/-dec  toco: q2min    Plan:  for epidural  expectant management for now as pt is [place] q2min    [place], PGy-4  d/w Dr. Price
Gutter Splint

## 2022-07-05 ENCOUNTER — INPATIENT (INPATIENT)
Facility: HOSPITAL | Age: 37
LOS: 1 days | Discharge: HOME | End: 2022-07-07
Attending: SPECIALIST | Admitting: SPECIALIST

## 2022-07-05 VITALS
DIASTOLIC BLOOD PRESSURE: 75 MMHG | RESPIRATION RATE: 17 BRPM | SYSTOLIC BLOOD PRESSURE: 115 MMHG | TEMPERATURE: 99 F | HEART RATE: 76 BPM

## 2022-07-05 DIAGNOSIS — O26.899 OTHER SPECIFIED PREGNANCY RELATED CONDITIONS, UNSPECIFIED TRIMESTER: ICD-10-CM

## 2022-07-05 DIAGNOSIS — Z3A.00 WEEKS OF GESTATION OF PREGNANCY NOT SPECIFIED: ICD-10-CM

## 2022-07-05 NOTE — OB RN TRIAGE NOTE - NSICDXPASTMEDICALHX_GEN_ALL_CORE_FT
PAST MEDICAL HISTORY:   (normal spontaneous vaginal delivery) 2019 FT F 5#3 IOL for IUGR    PMDD (premenstrual dysphoric disorder)

## 2022-07-06 ENCOUNTER — TRANSCRIPTION ENCOUNTER (OUTPATIENT)
Age: 37
End: 2022-07-06

## 2022-07-06 LAB
BASOPHILS # BLD AUTO: 0.02 K/UL — SIGNIFICANT CHANGE UP (ref 0–0.2)
BASOPHILS NFR BLD AUTO: 0.3 % — SIGNIFICANT CHANGE UP (ref 0–2)
BLD GP AB SCN SERPL QL: POSITIVE — SIGNIFICANT CHANGE UP
COVID-19 SPIKE DOMAIN AB INTERP: POSITIVE
COVID-19 SPIKE DOMAIN ANTIBODY RESULT: 2.16 U/ML — HIGH
EOSINOPHIL # BLD AUTO: 0.06 K/UL — SIGNIFICANT CHANGE UP (ref 0–0.5)
EOSINOPHIL NFR BLD AUTO: 0.8 % — SIGNIFICANT CHANGE UP (ref 0–6)
HCT VFR BLD CALC: 35 % — SIGNIFICANT CHANGE UP (ref 34.5–45)
HGB BLD-MCNC: 11.2 G/DL — LOW (ref 11.5–15.5)
IANC: 5.05 K/UL — SIGNIFICANT CHANGE UP (ref 1.8–7.4)
IMM GRANULOCYTES NFR BLD AUTO: 0.6 % — SIGNIFICANT CHANGE UP (ref 0–1.5)
KLEIHAUER-BETKE CALCULATION: 0 % — SIGNIFICANT CHANGE UP
LYMPHOCYTES # BLD AUTO: 1.47 K/UL — SIGNIFICANT CHANGE UP (ref 1–3.3)
LYMPHOCYTES # BLD AUTO: 20.5 % — SIGNIFICANT CHANGE UP (ref 13–44)
MCHC RBC-ENTMCNC: 28.6 PG — SIGNIFICANT CHANGE UP (ref 27–34)
MCHC RBC-ENTMCNC: 32 GM/DL — SIGNIFICANT CHANGE UP (ref 32–36)
MCV RBC AUTO: 89.3 FL — SIGNIFICANT CHANGE UP (ref 80–100)
MONOCYTES # BLD AUTO: 0.53 K/UL — SIGNIFICANT CHANGE UP (ref 0–0.9)
MONOCYTES NFR BLD AUTO: 7.4 % — SIGNIFICANT CHANGE UP (ref 2–14)
NEUTROPHILS # BLD AUTO: 5.05 K/UL — SIGNIFICANT CHANGE UP (ref 1.8–7.4)
NEUTROPHILS NFR BLD AUTO: 70.4 % — SIGNIFICANT CHANGE UP (ref 43–77)
NRBC # BLD: 0 /100 WBCS — SIGNIFICANT CHANGE UP
NRBC # FLD: 0 K/UL — SIGNIFICANT CHANGE UP
PLATELET # BLD AUTO: 231 K/UL — SIGNIFICANT CHANGE UP (ref 150–400)
RBC # BLD: 3.92 M/UL — SIGNIFICANT CHANGE UP (ref 3.8–5.2)
RBC # FLD: 13.2 % — SIGNIFICANT CHANGE UP (ref 10.3–14.5)
RH IG SCN BLD-IMP: NEGATIVE — SIGNIFICANT CHANGE UP
SARS-COV-2 IGG+IGM SERPL QL IA: 2.16 U/ML — HIGH
SARS-COV-2 IGG+IGM SERPL QL IA: POSITIVE
SARS-COV-2 RNA SPEC QL NAA+PROBE: SIGNIFICANT CHANGE UP
T PALLIDUM AB TITR SER: NEGATIVE — SIGNIFICANT CHANGE UP
WBC # BLD: 7.17 K/UL — SIGNIFICANT CHANGE UP (ref 3.8–10.5)
WBC # FLD AUTO: 7.17 K/UL — SIGNIFICANT CHANGE UP (ref 3.8–10.5)

## 2022-07-06 PROCEDURE — 86077 PHYS BLOOD BANK SERV XMATCH: CPT

## 2022-07-06 PROCEDURE — 59400 OBSTETRICAL CARE: CPT | Mod: U9

## 2022-07-06 RX ORDER — KETOROLAC TROMETHAMINE 30 MG/ML
30 SYRINGE (ML) INJECTION ONCE
Refills: 0 | Status: DISCONTINUED | OUTPATIENT
Start: 2022-07-06 | End: 2022-07-06

## 2022-07-06 RX ORDER — LANOLIN
1 OINTMENT (GRAM) TOPICAL EVERY 6 HOURS
Refills: 0 | Status: DISCONTINUED | OUTPATIENT
Start: 2022-07-06 | End: 2022-07-07

## 2022-07-06 RX ORDER — TETANUS TOXOID, REDUCED DIPHTHERIA TOXOID AND ACELLULAR PERTUSSIS VACCINE, ADSORBED 5; 2.5; 8; 8; 2.5 [IU]/.5ML; [IU]/.5ML; UG/.5ML; UG/.5ML; UG/.5ML
0.5 SUSPENSION INTRAMUSCULAR ONCE
Refills: 0 | Status: DISCONTINUED | OUTPATIENT
Start: 2022-07-06 | End: 2022-07-07

## 2022-07-06 RX ORDER — ACETAMINOPHEN 500 MG
975 TABLET ORAL
Refills: 0 | Status: DISCONTINUED | OUTPATIENT
Start: 2022-07-06 | End: 2022-07-07

## 2022-07-06 RX ORDER — SIMETHICONE 80 MG/1
80 TABLET, CHEWABLE ORAL EVERY 4 HOURS
Refills: 0 | Status: DISCONTINUED | OUTPATIENT
Start: 2022-07-06 | End: 2022-07-07

## 2022-07-06 RX ORDER — SODIUM CHLORIDE 9 MG/ML
3 INJECTION INTRAMUSCULAR; INTRAVENOUS; SUBCUTANEOUS EVERY 8 HOURS
Refills: 0 | Status: DISCONTINUED | OUTPATIENT
Start: 2022-07-06 | End: 2022-07-07

## 2022-07-06 RX ORDER — OXYTOCIN 10 UNIT/ML
333.33 VIAL (ML) INJECTION
Qty: 20 | Refills: 0 | Status: DISCONTINUED | OUTPATIENT
Start: 2022-07-06 | End: 2022-07-06

## 2022-07-06 RX ORDER — BENZOCAINE 10 %
1 GEL (GRAM) MUCOUS MEMBRANE EVERY 6 HOURS
Refills: 0 | Status: DISCONTINUED | OUTPATIENT
Start: 2022-07-06 | End: 2022-07-07

## 2022-07-06 RX ORDER — IBUPROFEN 200 MG
600 TABLET ORAL EVERY 6 HOURS
Refills: 0 | Status: COMPLETED | OUTPATIENT
Start: 2022-07-06 | End: 2023-06-04

## 2022-07-06 RX ORDER — MAGNESIUM HYDROXIDE 400 MG/1
30 TABLET, CHEWABLE ORAL
Refills: 0 | Status: DISCONTINUED | OUTPATIENT
Start: 2022-07-06 | End: 2022-07-07

## 2022-07-06 RX ORDER — IBUPROFEN 200 MG
600 TABLET ORAL EVERY 6 HOURS
Refills: 0 | Status: DISCONTINUED | OUTPATIENT
Start: 2022-07-06 | End: 2022-07-07

## 2022-07-06 RX ORDER — OXYCODONE HYDROCHLORIDE 5 MG/1
5 TABLET ORAL
Refills: 0 | Status: DISCONTINUED | OUTPATIENT
Start: 2022-07-06 | End: 2022-07-07

## 2022-07-06 RX ORDER — ESCITALOPRAM OXALATE 10 MG/1
1 TABLET, FILM COATED ORAL
Qty: 0 | Refills: 0 | DISCHARGE

## 2022-07-06 RX ORDER — OXYCODONE HYDROCHLORIDE 5 MG/1
5 TABLET ORAL ONCE
Refills: 0 | Status: DISCONTINUED | OUTPATIENT
Start: 2022-07-06 | End: 2022-07-07

## 2022-07-06 RX ORDER — DIPHENHYDRAMINE HCL 50 MG
25 CAPSULE ORAL EVERY 6 HOURS
Refills: 0 | Status: DISCONTINUED | OUTPATIENT
Start: 2022-07-06 | End: 2022-07-07

## 2022-07-06 RX ORDER — SODIUM CHLORIDE 9 MG/ML
1000 INJECTION, SOLUTION INTRAVENOUS
Refills: 0 | Status: DISCONTINUED | OUTPATIENT
Start: 2022-07-06 | End: 2022-07-06

## 2022-07-06 RX ORDER — PRAMOXINE HYDROCHLORIDE 150 MG/15G
1 AEROSOL, FOAM RECTAL EVERY 4 HOURS
Refills: 0 | Status: DISCONTINUED | OUTPATIENT
Start: 2022-07-06 | End: 2022-07-07

## 2022-07-06 RX ORDER — AER TRAVELER 0.5 G/1
1 SOLUTION RECTAL; TOPICAL EVERY 4 HOURS
Refills: 0 | Status: DISCONTINUED | OUTPATIENT
Start: 2022-07-06 | End: 2022-07-07

## 2022-07-06 RX ORDER — CHLORHEXIDINE GLUCONATE 213 G/1000ML
1 SOLUTION TOPICAL ONCE
Refills: 0 | Status: DISCONTINUED | OUTPATIENT
Start: 2022-07-06 | End: 2022-07-06

## 2022-07-06 RX ORDER — DIBUCAINE 1 %
1 OINTMENT (GRAM) RECTAL EVERY 6 HOURS
Refills: 0 | Status: DISCONTINUED | OUTPATIENT
Start: 2022-07-06 | End: 2022-07-07

## 2022-07-06 RX ORDER — HYDROCORTISONE 1 %
1 OINTMENT (GRAM) TOPICAL EVERY 6 HOURS
Refills: 0 | Status: DISCONTINUED | OUTPATIENT
Start: 2022-07-06 | End: 2022-07-07

## 2022-07-06 RX ADMIN — Medication 600 MILLIGRAM(S): at 15:00

## 2022-07-06 RX ADMIN — Medication 30 MILLIGRAM(S): at 06:43

## 2022-07-06 RX ADMIN — Medication 600 MILLIGRAM(S): at 12:35

## 2022-07-06 RX ADMIN — Medication 600 MILLIGRAM(S): at 20:09

## 2022-07-06 RX ADMIN — Medication 975 MILLIGRAM(S): at 22:03

## 2022-07-06 RX ADMIN — SODIUM CHLORIDE 3 MILLILITER(S): 9 INJECTION INTRAMUSCULAR; INTRAVENOUS; SUBCUTANEOUS at 22:14

## 2022-07-06 RX ADMIN — Medication 975 MILLIGRAM(S): at 22:40

## 2022-07-06 RX ADMIN — Medication 600 MILLIGRAM(S): at 20:40

## 2022-07-06 RX ADMIN — Medication 1 TABLET(S): at 12:35

## 2022-07-06 NOTE — OB PROVIDER H&P - PROBLEM SELECTOR PLAN 1
-Admit l&d. Routine labs   -Expectant management of labor  -Fetus: cat 1 tracing, vertex presentation, continuous monitoring  -GBS negative  -Pain: patient approved for epidural  -Covid 19 pending for patient  -Consents signed and witnessed at bedside

## 2022-07-06 NOTE — OB PROVIDER LABOR PROGRESS NOTE - NS_SUBJECTIVE/OBJECTIVE_OBGYN_ALL_OB_FT
Patient seen and examined secondary to FHR deceleration. Effective epidural in place. No complaints at this time.  VS  T(C): 36.8 (07-06-22 @ 01:15)  HR: 62 (07-06-22 @ 02:26)  BP: 117/57 (07-06-22 @ 02:26)  RR: 18 (07-06-22 @ 01:15)  SpO2: 96% (07-06-22 @ 02:23)

## 2022-07-06 NOTE — DISCHARGE NOTE OB - NS MD DC FALL RISK RISK
For information on Fall & Injury Prevention, visit: [email or web address] OR  [email or web address] OR  https://www.cdc.gov/steadi/patient.html

## 2022-07-06 NOTE — OB PROVIDER H&P - NSHPPHYSICALEXAM_GEN_ALL_CORE
Vital Signs Last 24 Hrs  T(C): 37.3 (05 Jul 2022 23:46), Max: 37.3 (05 Jul 2022 23:41)  T(F): 99.14 (05 Jul 2022 23:46), Max: 99.14 (05 Jul 2022 23:46)  HR: 76 (05 Jul 2022 23:51) (76 - 76)  BP: 115/75 (05 Jul 2022 23:51) (115/75 - 115/75)  BP(mean): --  RR: 17 (05 Jul 2022 23:41) (17 - 17)  SpO2: --    ABdomen: soft, non tender. no guarding or rebound tenderness  SVE: 3/70/-3  TAS: vertex presentation  EFW 3175gm by Leopold's    NST reactive with moderate variability, +accels  toco: ctx 4-5 minutes

## 2022-07-06 NOTE — DISCHARGE NOTE OB - MATERIALS PROVIDED
Vaccinations/[facility]  Screening Program/  Immunization Record/Breastfeeding Log/Guide to Postpartum Care/[facility] Hearing Screen Program/Back To Sleep Handout/Shaken Baby Prevention Handout/Birth Certificate Instructions/Tdap Vaccination (VIS Pub Date: 2012)

## 2022-07-06 NOTE — OB NEONATOLOGY/PEDIATRICIAN DELIVERY SUMMARY - NSPEDSNEONOTESA_OBGYN_ALL_OB_FT
Baby was born via  at 39.5 weeks gestation to a  37 y.o. F. Maternal labs include Blood Type B-, GBS-, Hep B-, RPR-, Rubella imm, HIV-, Covid-. Maternal history is significant for PMS (on Leapro). Pregnancy was uncomplicated. AROM on 22 at 0322 with clear fluid, at approximately 3 hours. Delivery uncomplicated. Resuscitation included w/d/s/s. CPAP  started by L&D RN at ~5 MOL for low sats and iWOB.  team subsequently called and then arrived to Pt by ~12 MOL to supplement care. Max CPAP -35% given until ~30 MOL with improvement in sats and WOB but unable to wean to room air. Baby transferred to NICU on CPAP 5/30%. Apgars were 7/9. EOS score: 0.07. Feeding: Breast exclusively. Consents to hep B. Admit to NBN.

## 2022-07-06 NOTE — OB PROVIDER H&P - ASSESSMENT
38 y/o AMA pt 39.5 weeks  presents in labor for pain management  d/w Dr. Faulkner  Plan:  -Admit l&d. Routine labs   -Expectant management of labor  -Fetus: cat 1 tracing, vertex presentation, continuous monitoring  -GBS negative  -Pain: patient approved for epidural  -Covid 19 pending for patient  -Consents signed and witnessed at bedside

## 2022-07-06 NOTE — OB PROVIDER H&P - HISTORY OF PRESENT ILLNESS
36 y/o pt 39.5 weeks  presents to triage with c/o painful contractions since 1800. pt reports 10/10 pain with contraction. pt denies any lof or bleeding. pt denies n/v/d, fever or chills. pt endorses+fetal movement. pt reports VE in office on 22 was 1cm  AP uncomplicated thus far    NKDA  PMH: denies   PSH: denies  OB:   2019 FT 5#13 IOL IUGR   2020 FT 6#15 IOL cholestasis   GYN:  denies fibroids/cysts/stds  SOcial hx:   denies etoh/smoking/illicit drugs  Medications:  PNV  Lexapro 20mg for PMS but continued during pregnancy

## 2022-07-06 NOTE — OB PROVIDER LABOR PROGRESS NOTE - NS_SUBJECTIVE/OBJECTIVE_OBGYN_ALL_OB_FT
Patient seen for AROM.  VS  T(C): 36.8 (07-06-22 @ 01:15)  HR: 59 (07-06-22 @ 03:08)  BP: 128/79 (07-06-22 @ 03:08)  RR: 18 (07-06-22 @ 01:15)  SpO2: 100% (07-06-22 @ 03:08)

## 2022-07-06 NOTE — OB PROVIDER LABOR PROGRESS NOTE - ASSESSMENT
@39.5 Labor  Cervical change noted  Patient repositioned   Change in FHR tracing baseline noted post intrauterine resuscitation   Consider AROM  Will reassess PRN    Dandy Faulkner MD

## 2022-07-06 NOTE — OB RN DELIVERY SUMMARY - NSSELHIDDEN_OBGYN_ALL_OB_FT
[NS_DeliveryAttending1_OBGYN_ALL_OB_FT:[id number]],[NS_DeliveryAssist1_OBGYN_ALL_OB_FT:[id number]],[NS_DeliveryRN_OBGYN_ALL_OB_FT:[id number]=]

## 2022-07-06 NOTE — OB PROVIDER DELIVERY SUMMARY - NSPROVIDERDELIVERYNOTE_OBGYN_ALL_OB_FT
The patient became fully dilated with urge to push.  of a viable female infant. Head, shoulders and body delivered easily in OA position. There was delayed cord clamping of 30 seconds. The  was placed on maternal abdomen. The placenta delivered spontaneously, intact, with a three vessel cord. clear fluid noted. Pitocin was administered. The uterus, cervix, vagina and perineum were palpated and inspected, no lacerations or retained products were noted. Fundus and lower uterine segment firm. There was excellent hemostasis and QBL 45. Mother, father and  bonding in birthing room.    Present for delivery were Dr. Faulkner, Dr. Farideh Gong PGY-1

## 2022-07-06 NOTE — OB PROVIDER LABOR PROGRESS NOTE - ASSESSMENT
AROM- clear fluid  Cervical change noted  Consider pitocin if contraction pattern remains inadequate   Will reassess PRN    Dandy Faulkner

## 2022-07-06 NOTE — OB RN PATIENT PROFILE - FUNCTIONAL ASSESSMENT - BASIC MOBILITY 6.
4-calculated by average/Not able to assess (calculate score using [facility] averaging method)

## 2022-07-06 NOTE — OB PROVIDER DELIVERY SUMMARY - NSSELHIDDEN_OBGYN_ALL_OB_FT
[NS_DeliveryAttending1_OBGYN_ALL_OB_FT:[id number]],[NS_DeliveryAssist1_OBGYN_ALL_OB_FT:[id number]=]

## 2022-07-06 NOTE — DISCHARGE NOTE OB - PATIENT PORTAL LINK FT
You can access the FollowMyHealth Patient Portal offered by [facility] by registering at the following website: [email or web address]. By joining [employer]’s FollowMyHealth portal, you will also be able to view your health information using other applications (apps) compatible with our system.

## 2022-07-06 NOTE — DISCHARGE NOTE OB - CARE PROVIDER_API CALL
Naeem Freeman)  Obstetrics and Gynecology  [address] [place] [address]  Phone: [phone]  Fax: [phone]  Follow Up Time:

## 2022-07-06 NOTE — DISCHARGE NOTE OB - CARE PLAN
Assessment and plan of treatment:	F/U 6 weeks   1 Principal Discharge DX:	 (spontaneous vaginal delivery)  Assessment and plan of treatment:	F/U 6 weeks

## 2022-07-06 NOTE — OB RN DELIVERY SUMMARY - NS_SEPSISRSKCALC_OBGYN_ALL_OB_FT
EOS calculated successfully. EOS Risk Factor: 0.5/1000 live births ([facility] national incidence); GA=39w5d; Temp=99.14; ROM=2.383; GBS='Negative'; Antibiotics='No antibiotics or any antibiotics < 2 hrs prior to birth'

## 2022-07-07 VITALS
HEART RATE: 65 BPM | DIASTOLIC BLOOD PRESSURE: 73 MMHG | OXYGEN SATURATION: 99 % | RESPIRATION RATE: 20 BRPM | SYSTOLIC BLOOD PRESSURE: 126 MMHG | TEMPERATURE: 97 F

## 2022-07-07 RX ADMIN — Medication 600 MILLIGRAM(S): at 13:00

## 2022-07-07 RX ADMIN — Medication 600 MILLIGRAM(S): at 12:00

## 2022-07-07 RX ADMIN — Medication 600 MILLIGRAM(S): at 05:04

## 2022-07-07 RX ADMIN — Medication 600 MILLIGRAM(S): at 05:53

## 2022-07-07 RX ADMIN — SODIUM CHLORIDE 3 MILLILITER(S): 9 INJECTION INTRAMUSCULAR; INTRAVENOUS; SUBCUTANEOUS at 04:51

## 2022-07-07 NOTE — PROGRESS NOTE ADULT - SUBJECTIVE AND OBJECTIVE BOX
S: Patient doing well. Minimal lochia. Pain controlled.    O: Vital Signs Last 24 Hrs  T(C): 36.4 (2022 05:30), Max: 36.7 (2022 14:00)  T(F): 97.5 (2022 05:30), Max: 98 (2022 14:00)  HR: 67 (2022 05:30) (58 - 75)  BP: 128/80 (2022 05:30) (103/68 - 128/80)  BP(mean): --  RR: 18 (2022 05:30) (17 - 18)  SpO2: 100% (2022 05:30) (100% - 100%)    Gen: NAD  Abd: soft, NT, ND, fundus firm below umbilicus  Lochia: moderate  Ext: no tenderness    Labs:                        11.2   7.17  )-----------( 231      ( 2022 01:09 )             35.0       A: 37y PPD#1s/p  doing well.  Plan: Routine care. Dc with instructions.

## 2022-08-31 NOTE — OB PROVIDER H&P - NSGENETICTESTING_OBGYN_ALL_OB
Okay to receive moderately thick liquids with assistance as pleasure feed. Continue Jevity 1.5 starting at 10 ml/hr advancing by 10 ml q4 hours/as tolerated to goal rate 70 ml/hr x 18 hours (ordered for oral Synthroid). At goal rate, regimen provides: 1260 ml total volume, 1890 kcal, 80 g protein, 958 ml water (24 kcal/kg, 1 g/kg protein based on dosing weight).  No, other reason

## 2022-11-24 NOTE — OB RN PATIENT PROFILE - AGENT'S NAME
Airway patent, Nasal mucosa clear. Mouth with normal mucosa. Throat has no vesicles, no oropharyngeal exudates and uvula is midline.
nell marie

## 2023-04-19 NOTE — OB RN PATIENT PROFILE - LIMIT VISITORS, INFANT PROFILE
[place]poni Counseling:  I discussed with the patient the risks of golimumab including but not limited to myelosuppression, immunosuppression, autoimmune hepatitis, demyelinating diseases, lymphoma, and serious infections.  The patient understands that monitoring is required including a PPD at baseline and must alert us or the primary physician if symptoms of infection or other concerning signs are noted. no

## 2023-08-04 NOTE — OB RN PATIENT PROFILE - PRO HIV INFANT
NST was done today. Pt. Reported active fetal movement at home between visit. Daily fetal kick count reviewed and emphasized. Patient verbalized understanding of all and was receptive. unknown negative

## 2024-05-09 NOTE — OB PROVIDER H&P - NS_FETALMONCTXPAT_OBGYN_ALL_OB
Please review;  [facility] protocol failed/ No protocol     Requested Prescriptions   Pending Prescriptions Disp Refills    lisdexamfetamine (VYVANSE) 30 MG Oral Cap 30 capsule 0     Sig: Take 1 capsule (30 mg total) by mouth daily.       Controlled Substance Medication Failed - 5/8/2024  2:50 PM        Failed - This medication is a controlled substance - forward to provider to refill             Recent Outpatient Visits              2 months ago Attention deficit disorder, unspecified hyperactivity presence    [facility], Cassie Huston Tanja, MD    Office Visit    5 months ago School physical exam    [facility] [place] [facility]    Office Visit             Irregular Contractions

## 2024-12-09 ENCOUNTER — NON-APPOINTMENT (OUTPATIENT)
Age: 39
End: 2024-12-09

## 2024-12-09 DIAGNOSIS — E03.9 HYPOTHYROIDISM, UNSPECIFIED: ICD-10-CM

## 2024-12-09 DIAGNOSIS — Z83.3 FAMILY HISTORY OF DIABETES MELLITUS: ICD-10-CM

## 2024-12-09 DIAGNOSIS — Z98.890 OTHER SPECIFIED POSTPROCEDURAL STATES: ICD-10-CM

## 2024-12-09 DIAGNOSIS — Z78.9 OTHER SPECIFIED HEALTH STATUS: ICD-10-CM

## 2024-12-09 DIAGNOSIS — Z30.430 ENCOUNTER FOR INSERTION OF INTRAUTERINE CONTRACEPTIVE DEVICE: ICD-10-CM

## 2024-12-09 RX ORDER — DROSPIRENONE 4 MG/1
4 TABLET, FILM COATED ORAL
Refills: 0 | Status: ACTIVE | COMMUNITY

## 2024-12-09 RX ORDER — ESCITALOPRAM OXALATE 20 MG/1
20 TABLET, FILM COATED ORAL
Refills: 0 | Status: ACTIVE | COMMUNITY

## 2024-12-10 ENCOUNTER — APPOINTMENT (OUTPATIENT)
Facility: CLINIC | Age: 39
End: 2024-12-10

## 2024-12-10 VITALS — DIASTOLIC BLOOD PRESSURE: 74 MMHG | SYSTOLIC BLOOD PRESSURE: 121 MMHG

## 2024-12-10 DIAGNOSIS — Z01.419 ENCOUNTER FOR GYNECOLOGICAL EXAMINATION (GENERAL) (ROUTINE) W/OUT ABNORMAL FINDINGS: ICD-10-CM

## 2024-12-10 DIAGNOSIS — Z31.89 ENCOUNTER FOR OTHER PROCREATIVE MANAGEMENT: ICD-10-CM

## 2024-12-10 LAB — HCG UR QL: NEGATIVE

## 2024-12-10 PROCEDURE — 99459 PELVIC EXAMINATION: CPT

## 2024-12-10 PROCEDURE — 81025 URINE PREGNANCY TEST: CPT

## 2024-12-10 PROCEDURE — 99395 PREV VISIT EST AGE 18-39: CPT | Mod: 25

## 2024-12-10 PROCEDURE — 58301 REMOVE INTRAUTERINE DEVICE: CPT

## 2024-12-13 LAB — HPV HIGH+LOW RISK DNA PNL CVX: NOT DETECTED

## 2024-12-16 NOTE — DISCHARGE NOTE OB - AVOID SITTING IN ONE POSITION FOR MORE THAN ONE HOUR
Please call  Polyp biopsy benign  Additional intervention will be needed if bleeding returns  Contact us if questions  Consider redo colonoscopy 5 years, health permitting    The pathology results demonstrated Tubular adenoma   Statement Selected

## 2024-12-17 LAB — CYTOLOGY CVX/VAG DOC THIN PREP: NORMAL

## 2025-02-20 ENCOUNTER — APPOINTMENT (OUTPATIENT)
Dept: ULTRASOUND IMAGING | Facility: CLINIC | Age: 40
End: 2025-02-20
Payer: MEDICAID

## 2025-02-20 ENCOUNTER — RESULT REVIEW (OUTPATIENT)
Age: 40
End: 2025-02-20

## 2025-02-20 ENCOUNTER — APPOINTMENT (OUTPATIENT)
Dept: MAMMOGRAPHY | Facility: CLINIC | Age: 40
End: 2025-02-20
Payer: MEDICAID

## 2025-02-20 ENCOUNTER — OUTPATIENT (OUTPATIENT)
Dept: OUTPATIENT SERVICES | Facility: HOSPITAL | Age: 40
LOS: 1 days | End: 2025-02-20
Payer: MEDICAID

## 2025-02-20 DIAGNOSIS — Z00.00 ENCOUNTER FOR GENERAL ADULT MEDICAL EXAMINATION WITHOUT ABNORMAL FINDINGS: ICD-10-CM

## 2025-02-20 PROCEDURE — 77063 BREAST TOMOSYNTHESIS BI: CPT

## 2025-02-20 PROCEDURE — 76641 ULTRASOUND BREAST COMPLETE: CPT

## 2025-02-20 PROCEDURE — 77063 BREAST TOMOSYNTHESIS BI: CPT | Mod: 26

## 2025-02-20 PROCEDURE — 77067 SCR MAMMO BI INCL CAD: CPT | Mod: 26

## 2025-02-20 PROCEDURE — 76641 ULTRASOUND BREAST COMPLETE: CPT | Mod: 26,50

## 2025-02-20 PROCEDURE — 77067 SCR MAMMO BI INCL CAD: CPT

## 2025-02-25 ENCOUNTER — RESULT REVIEW (OUTPATIENT)
Age: 40
End: 2025-02-25

## 2025-02-27 ENCOUNTER — APPOINTMENT (OUTPATIENT)
Dept: MAMMOGRAPHY | Facility: IMAGING CENTER | Age: 40
End: 2025-02-27
Payer: MEDICAID

## 2025-02-27 ENCOUNTER — RESULT REVIEW (OUTPATIENT)
Age: 40
End: 2025-02-27

## 2025-02-27 ENCOUNTER — OUTPATIENT (OUTPATIENT)
Dept: OUTPATIENT SERVICES | Facility: HOSPITAL | Age: 40
LOS: 1 days | End: 2025-02-27
Payer: MEDICAID

## 2025-02-27 ENCOUNTER — APPOINTMENT (OUTPATIENT)
Dept: ULTRASOUND IMAGING | Facility: IMAGING CENTER | Age: 40
End: 2025-02-27
Payer: MEDICAID

## 2025-02-27 DIAGNOSIS — Z00.8 ENCOUNTER FOR OTHER GENERAL EXAMINATION: ICD-10-CM

## 2025-02-27 PROCEDURE — 76642 ULTRASOUND BREAST LIMITED: CPT | Mod: 26,50

## 2025-02-27 PROCEDURE — 77065 DX MAMMO INCL CAD UNI: CPT

## 2025-02-27 PROCEDURE — G0279: CPT

## 2025-02-27 PROCEDURE — 77065 DX MAMMO INCL CAD UNI: CPT | Mod: 26,LT

## 2025-02-27 PROCEDURE — 76642 ULTRASOUND BREAST LIMITED: CPT

## 2025-02-27 PROCEDURE — 77061 BREAST TOMOSYNTHESIS UNI: CPT | Mod: 26

## 2025-03-11 ENCOUNTER — OUTPATIENT (OUTPATIENT)
Dept: OUTPATIENT SERVICES | Facility: HOSPITAL | Age: 40
LOS: 1 days | End: 2025-03-11
Payer: MEDICAID

## 2025-03-11 ENCOUNTER — RESULT REVIEW (OUTPATIENT)
Age: 40
End: 2025-03-11

## 2025-03-11 ENCOUNTER — APPOINTMENT (OUTPATIENT)
Dept: ULTRASOUND IMAGING | Facility: IMAGING CENTER | Age: 40
End: 2025-03-11
Payer: MEDICAID

## 2025-03-11 DIAGNOSIS — Z00.8 ENCOUNTER FOR OTHER GENERAL EXAMINATION: ICD-10-CM

## 2025-03-11 PROCEDURE — 88305 TISSUE EXAM BY PATHOLOGIST: CPT | Mod: 26

## 2025-03-11 PROCEDURE — 88305 TISSUE EXAM BY PATHOLOGIST: CPT

## 2025-03-11 PROCEDURE — 77065 DX MAMMO INCL CAD UNI: CPT

## 2025-03-11 PROCEDURE — A4648: CPT

## 2025-03-11 PROCEDURE — 19083 BX BREAST 1ST LESION US IMAG: CPT

## 2025-03-11 PROCEDURE — 77065 DX MAMMO INCL CAD UNI: CPT | Mod: 26,RT

## 2025-03-11 PROCEDURE — 19083 BX BREAST 1ST LESION US IMAG: CPT | Mod: RT

## 2025-03-13 LAB — SURGICAL PATHOLOGY STUDY: SIGNIFICANT CHANGE UP

## 2025-07-23 PROBLEM — D24.9 FIBROADENOMA: Status: ACTIVE | Noted: 2025-07-23

## 2025-07-29 ENCOUNTER — NON-APPOINTMENT (OUTPATIENT)
Age: 40
End: 2025-07-29

## 2025-07-29 ENCOUNTER — APPOINTMENT (OUTPATIENT)
Dept: SURGICAL ONCOLOGY | Facility: CLINIC | Age: 40
End: 2025-07-29
Payer: MEDICAID

## 2025-07-29 VITALS
TEMPERATURE: 97.7 F | WEIGHT: 157 LBS | RESPIRATION RATE: 17 BRPM | HEIGHT: 63.5 IN | DIASTOLIC BLOOD PRESSURE: 71 MMHG | HEART RATE: 63 BPM | SYSTOLIC BLOOD PRESSURE: 111 MMHG | OXYGEN SATURATION: 98 % | BODY MASS INDEX: 27.47 KG/M2

## 2025-07-29 DIAGNOSIS — D24.9 BENIGN NEOPLASM OF UNSPECIFIED BREAST: ICD-10-CM

## 2025-07-29 PROCEDURE — 99203 OFFICE O/P NEW LOW 30 MIN: CPT

## 2025-09-16 ENCOUNTER — APPOINTMENT (OUTPATIENT)
Facility: CLINIC | Age: 40
End: 2025-09-16

## 2025-09-16 VITALS
WEIGHT: 161 LBS | HEIGHT: 63 IN | DIASTOLIC BLOOD PRESSURE: 74 MMHG | BODY MASS INDEX: 28.53 KG/M2 | SYSTOLIC BLOOD PRESSURE: 110 MMHG

## 2025-09-16 DIAGNOSIS — N83.209 UNSPECIFIED OVARIAN CYST, UNSPECIFIED SIDE: ICD-10-CM

## 2025-09-16 LAB — HCG UR QL: POSITIVE

## 2025-09-19 LAB — BACTERIA UR CULT: NORMAL

## 2025-09-25 LAB
C TRACH RRNA SPEC QL NAA+PROBE: NOT DETECTED
N GONORRHOEA RRNA SPEC QL NAA+PROBE: NOT DETECTED
SOURCE AMPLIFICATION: NORMAL